# Patient Record
Sex: FEMALE | Race: WHITE | Employment: FULL TIME | URBAN - METROPOLITAN AREA
[De-identification: names, ages, dates, MRNs, and addresses within clinical notes are randomized per-mention and may not be internally consistent; named-entity substitution may affect disease eponyms.]

---

## 2017-05-15 ENCOUNTER — TRANSFERRED RECORDS (OUTPATIENT)
Dept: HEALTH INFORMATION MANAGEMENT | Facility: CLINIC | Age: 22
End: 2017-05-15

## 2017-05-23 ENCOUNTER — HOSPITAL ENCOUNTER (INPATIENT)
Facility: CLINIC | Age: 22
LOS: 3 days | Discharge: HOME | DRG: 885 | End: 2017-05-26
Attending: PSYCHIATRY & NEUROLOGY | Admitting: PSYCHIATRY & NEUROLOGY
Payer: COMMERCIAL

## 2017-05-23 DIAGNOSIS — F41.9 ANXIETY: Primary | ICD-10-CM

## 2017-05-23 DIAGNOSIS — F31.30 BIPOLAR I DISORDER, MOST RECENT EPISODE DEPRESSED (H): ICD-10-CM

## 2017-05-23 PROBLEM — F48.9 MENTAL HEALTH PROBLEM: Status: ACTIVE | Noted: 2017-05-23

## 2017-05-23 PROCEDURE — 25000132 ZZH RX MED GY IP 250 OP 250 PS 637: Performed by: PSYCHIATRY & NEUROLOGY

## 2017-05-23 PROCEDURE — 12400001 ZZH R&B MH UMMC

## 2017-05-23 RX ORDER — HYDROXYZINE HYDROCHLORIDE 25 MG/1
25-50 TABLET, FILM COATED ORAL EVERY 4 HOURS PRN
Status: DISCONTINUED | OUTPATIENT
Start: 2017-05-23 | End: 2017-05-26 | Stop reason: HOSPADM

## 2017-05-23 RX ORDER — BISACODYL 10 MG
10 SUPPOSITORY, RECTAL RECTAL DAILY PRN
Status: DISCONTINUED | OUTPATIENT
Start: 2017-05-23 | End: 2017-05-26 | Stop reason: HOSPADM

## 2017-05-23 RX ORDER — TRAZODONE HYDROCHLORIDE 50 MG/1
50 TABLET, FILM COATED ORAL
Status: DISCONTINUED | OUTPATIENT
Start: 2017-05-23 | End: 2017-05-26 | Stop reason: HOSPADM

## 2017-05-23 RX ORDER — CLONAZEPAM 0.5 MG/1
1 TABLET ORAL 3 TIMES DAILY PRN
Status: DISCONTINUED | OUTPATIENT
Start: 2017-05-23 | End: 2017-05-23

## 2017-05-23 RX ORDER — ACETAMINOPHEN 325 MG/1
650 TABLET ORAL EVERY 4 HOURS PRN
Status: DISCONTINUED | OUTPATIENT
Start: 2017-05-23 | End: 2017-05-26 | Stop reason: HOSPADM

## 2017-05-23 RX ORDER — ALUMINA, MAGNESIA, AND SIMETHICONE 2400; 2400; 240 MG/30ML; MG/30ML; MG/30ML
30 SUSPENSION ORAL EVERY 4 HOURS PRN
Status: DISCONTINUED | OUTPATIENT
Start: 2017-05-23 | End: 2017-05-26 | Stop reason: HOSPADM

## 2017-05-23 RX ORDER — NICOTINE 21 MG/24HR
1 PATCH, TRANSDERMAL 24 HOURS TRANSDERMAL DAILY
Status: DISCONTINUED | OUTPATIENT
Start: 2017-05-24 | End: 2017-05-26 | Stop reason: HOSPADM

## 2017-05-23 RX ORDER — OLANZAPINE 10 MG/1
10 TABLET ORAL
Status: DISCONTINUED | OUTPATIENT
Start: 2017-05-23 | End: 2017-05-26 | Stop reason: HOSPADM

## 2017-05-23 RX ORDER — RISPERIDONE 0.5 MG/1
0.5 TABLET ORAL 2 TIMES DAILY
Status: DISCONTINUED | OUTPATIENT
Start: 2017-05-23 | End: 2017-05-26 | Stop reason: HOSPADM

## 2017-05-23 RX ORDER — CLONAZEPAM 0.5 MG/1
1.5 TABLET ORAL DAILY PRN
Status: DISCONTINUED | OUTPATIENT
Start: 2017-05-24 | End: 2017-05-26 | Stop reason: HOSPADM

## 2017-05-23 RX ORDER — OLANZAPINE 10 MG/2ML
10 INJECTION, POWDER, FOR SOLUTION INTRAMUSCULAR
Status: DISCONTINUED | OUTPATIENT
Start: 2017-05-23 | End: 2017-05-26 | Stop reason: HOSPADM

## 2017-05-23 RX ADMIN — NICOTINE POLACRILEX 8 MG: 4 GUM, CHEWING ORAL at 23:08

## 2017-05-23 RX ADMIN — RISPERIDONE 0.5 MG: 0.5 TABLET ORAL at 23:08

## 2017-05-23 RX ADMIN — OLANZAPINE 10 MG: 10 TABLET, FILM COATED ORAL at 23:08

## 2017-05-23 ASSESSMENT — ACTIVITIES OF DAILY LIVING (ADL)
DRESS: INDEPENDENT
GROOMING: INDEPENDENT
ORAL_HYGIENE: INDEPENDENT

## 2017-05-23 NOTE — IP AVS SNAPSHOT
[place] [facility]    [facility] [address]    [address] [address]    Phone:  [phone]                                       After Visit Summary   5/23/2017    Raissa Ruth    MRN: [id number]           After Visit Summary Signature Page     I have received my discharge instructions, and my questions have been answered. I have discussed any challenges I see with this plan with the nurse or doctor.    ..........................................................................................................................................  Patient/Patient Representative Signature      ..........................................................................................................................................  Patient Representative Print Name and Relationship to Patient    ..................................................               ................................................  Date                                            Time    ..........................................................................................................................................  Reviewed by Signature/Title    ...................................................              ..............................................  Date                                                            Time

## 2017-05-23 NOTE — IP AVS SNAPSHOT
MRN:[id number]                      After Visit Summary   5/23/2017    Raissa Ruth    MRN: [id number]           Patient Information     Date Of Birth          1995        Designated Caregiver       Most Recent Value    Caregiver    Will someone help with your care after discharge? no      About your hospital stay     You were admitted on:  May 23, 2017 You last received care in the:  [facility]    You were discharged on:  May 26, 2017       Who to Call     For medical emergencies, please call 911.  For non-urgent questions about your medical care, please call your primary care provider or clinic, None          Attending Provider     Provider Specialty    Shamar Henderson MD Psychiatry       Primary Care Provider    [facility]       No address on file        Further instructions from your care team       Behavioral Discharge Planning and Instructions       Summary: You were admitted on 5/23/2017 to Station [address] [place] for increase in depression and suicidal ideation.    You were treated by Debra Naegele, APRN, CNS and discharged on 5/26/17. .     Disposition: Discharged to home    Principal Diagnosis:  Bipolar Disorder; Cannabis use disorder, moderate; Amphetamine use disorder, moderate; Benzodiazepine use disorder, moderate.      Psychiatry Follow-up Appointment:  5/31/17 @ 2:30pm:  Psychiatrist/Dayan Akers @ [facility]:  [address] [place], [place].  # [phone].    Therapist:  SAIMA Rodriguez therapist @ [facility] [place] [facility]  Individual Therapist:  Patience Piña @ [facility], [place]:    Appointments are scheduled     Chemical Dependency Referrals have been made to the following treatment facilities:  [place]:  [facility]:  [place], [place].      Attend all scheduled appointments with your outpatient providers. Call at least 24 hours in advance if you need to reschedule an  appointment to ensure continued access to your outpatient providers.   Major Treatments, Procedures and Findings: You were provided with: a psychiatric assessment, assessed for medical stability, medication evaluation and/or management, individual therapy, art therapy, CD evaluation/assessment and milieu management.    Symptoms to Report: If you experience any of the following symptoms please report them right away to your provider or to family/friends; feeling more aggressive, increased confusion, losing more sleep, mood getting worse or thoughts of suicide.    Early warning signs can include: Early warning signs that could signal a potential relapse could include but not limited to the following; increased depression or anxiety sleep disturbances increased thoughts or behaviors of suicide or self-harm  increased unusual thinking, such as paranoia or hearing voices.    Safety and Wellness: Ask your support system to help you reduce your access to items that could harm yourself or others. If there is a concern for safety, call 911..    Resources:    Crisis Intervention: 363.436.2495 or 855-274-7175 (TTY: 571.170.3125).  Call anytime for help.  [place] on Mental Illness (www.[place].ольг[place].org): 460.215.4259 or 862-890-5918.  Suicide Awareness Voices of Education (SAVE) (www.save.org): 698-858-ZQRZ (0680)  National Suicide Prevention Line (www.mentalhealthmn.org): 871-320-HELS (6277)  Mental Health Consumer/Survivor Network of [place] (www.mhcsn.net): 971.128.8492 or 727-700-0378  Mental Health Association of [place] (www.mentalhealth.org): 560.142.9465 or 308-985-5335    The treatment team has appreciated the opportunity to work with you. Raissa,  please take care and make your recovery a daily recovery. If you have any questions or concerns our unit number is [phone].       Pending Results     No orders found from 5/21/2017 to 5/24/2017.            Admission Information     Date & Time Provider Department Dept. Phone  "   2017 Shamar Henderson MD Young Adult [facility] [phone]      Your Vitals Were     Blood Pressure Pulse Temperature Respirations Height Weight    102/66 96 97.2  F (36.2  C) (Oral) 16 1.702 m (5' 7\") 56.8 kg (125 lb 3.2 oz)    Pulse Oximetry BMI (Body Mass Index)                96% 19.61 kg/m2          [employer] Information     [employer] lets you send messages to your doctor, view your test results, renew your prescriptions, schedule appointments and more. To sign up, go to [email or web address] . Click on \"Log in\" on the left side of the screen, which will take you to the Welcome page. Then click on \"Sign up Now\" on the right side of the page.     You will be asked to enter the access code listed below, as well as some personal information. Please follow the directions to create your username and password.     Your access code is: [email or web address]  Expires: 2017  1:15 PM     Your access code will  in 90 days. If you need help or a new code, please call your [place] clinic or [phone].        Care EveryWhere ID     This is your Care EveryWhere ID. This could be used by other organizations to access your [place] medical records  [id number]           Review of your medicines      START taking        Dose / Directions    hydrOXYzine 25 MG tablet   Commonly known as:  ATARAX   Used for:  Anxiety        Dose:  25-50 mg   Take 1-2 tablets (25-50 mg) by mouth every 4 hours as needed for anxiety   Quantity:  120 tablet   Refills:  1       lithium 300 MG CR tablet   Commonly known as:  ESKALITH/LITHOBID   Used for:  Bipolar I disorder, most recent episode depressed (H)        Dose:  600 mg   Take 2 tablets (600 mg) by mouth At Bedtime   Quantity:  60 tablet   Refills:  1       risperiDONE 0.5 MG tablet   Commonly known as:  risperDAL   Used for:  Bipolar I disorder, most recent episode depressed (H)        Dose:  0.5 mg   Take 1 tablet (0.5 mg) by mouth 2 times daily   Quantity:  60 " tablet   Refills:  1         CONTINUE these medicines which have NOT CHANGED        Dose / Directions    LANsoprazole 15 MG CR capsule   Commonly known as:  PREVACID        Refills:  2       medroxyPROGESTERone 150 MG/ML injection   Commonly known as:  DEPO-PROVERA   Used for:  Other and unspecified ovarian cyst, Unspecified symptom associated with female genital organs        Dose:  150 mg   Inject 1 mL (150 mg) into the muscle every 3 months   Quantity:  3 mL   Refills:  3         STOP taking     buPROPion 150 MG 24 hr tablet   Commonly known as:  WELLBUTRIN XL           clonazePAM 1 MG tablet   Commonly known as:  klonoPIN           DULoxetine 60 MG EC capsule   Commonly known as:  CYMBALTA           OXcarbazepine 150 MG tablet   Commonly known as:  TRILEPTAL                Where to get your medicines      These medications were sent to [facility] [place], [place] - [address]  [address] [facility] [address], [facility] [place] [address]     Phone:  [phone]     hydrOXYzine 25 MG tablet    lithium 300 MG CR tablet    risperiDONE 0.5 MG tablet                Protect others around you: Learn how to safely use, store and throw away your medicines at www.disposemymeds.org.             Medication List: This is a list of all your medications and when to take them. Check marks below indicate your daily home schedule. Keep this list as a reference.      Medications           Morning Afternoon Evening Bedtime As Needed    hydrOXYzine 25 MG tablet   Commonly known as:  ATARAX   Take 1-2 tablets (25-50 mg) by mouth every 4 hours as needed for anxiety   Last time this was given:  50 mg on 5/26/2017  1:53 PM                                   LANsoprazole 15 MG CR capsule   Commonly known as:  PREVACID                                lithium 300 MG CR tablet   Commonly known as:  ESKALITH/LITHOBID   Take 2 tablets (600 mg) by mouth At Bedtime   Last time this was given:  600 mg on 5/25/2017  8:06 PM                                    medroxyPROGESTERone 150 MG/ML injection   Commonly known as:  DEPO-PROVERA   Inject 1 mL (150 mg) into the muscle every 3 months                                risperiDONE 0.5 MG tablet   Commonly known as:  risperDAL   Take 1 tablet (0.5 mg) by mouth 2 times daily   Last time this was given:  0.5 mg on 5/26/2017  8:45 AM

## 2017-05-24 PROCEDURE — 99222 1ST HOSP IP/OBS MODERATE 55: CPT | Mod: AI | Performed by: CLINICAL NURSE SPECIALIST

## 2017-05-24 PROCEDURE — 25000132 ZZH RX MED GY IP 250 OP 250 PS 637: Performed by: CLINICAL NURSE SPECIALIST

## 2017-05-24 PROCEDURE — 12400001 ZZH R&B MH UMMC

## 2017-05-24 PROCEDURE — 99221 1ST HOSP IP/OBS SF/LOW 40: CPT | Performed by: PHYSICIAN ASSISTANT

## 2017-05-24 PROCEDURE — 25000132 ZZH RX MED GY IP 250 OP 250 PS 637: Performed by: PSYCHIATRY & NEUROLOGY

## 2017-05-24 RX ORDER — LITHIUM CARBONATE 300 MG/1
600 TABLET, FILM COATED, EXTENDED RELEASE ORAL AT BEDTIME
Status: DISCONTINUED | OUTPATIENT
Start: 2017-05-24 | End: 2017-05-26 | Stop reason: HOSPADM

## 2017-05-24 RX ADMIN — CLONAZEPAM 1.5 MG: 0.5 TABLET ORAL at 16:29

## 2017-05-24 RX ADMIN — RISPERIDONE 0.5 MG: 0.5 TABLET ORAL at 13:51

## 2017-05-24 RX ADMIN — OLANZAPINE 10 MG: 10 TABLET, FILM COATED ORAL at 22:41

## 2017-05-24 RX ADMIN — LITHIUM CARBONATE 600 MG: 300 TABLET, EXTENDED RELEASE ORAL at 21:52

## 2017-05-24 RX ADMIN — NICOTINE POLACRILEX 8 MG: 4 GUM, CHEWING ORAL at 21:55

## 2017-05-24 RX ADMIN — NICOTINE POLACRILEX 8 MG: 4 GUM, CHEWING ORAL at 16:29

## 2017-05-24 RX ADMIN — RISPERIDONE 0.5 MG: 0.5 TABLET ORAL at 21:51

## 2017-05-24 RX ADMIN — NICOTINE POLACRILEX 8 MG: 4 GUM, CHEWING ORAL at 18:44

## 2017-05-24 RX ADMIN — NICOTINE 1 PATCH: 14 PATCH, EXTENDED RELEASE TRANSDERMAL at 13:52

## 2017-05-24 RX ADMIN — NICOTINE POLACRILEX 8 MG: 4 GUM, CHEWING ORAL at 14:06

## 2017-05-24 ASSESSMENT — ACTIVITIES OF DAILY LIVING (ADL)
AMBULATION: 0-->INDEPENDENT
PRIOR_FUNCTIONAL_LEVEL_COMMENT: POOR
DRESS: INDEPENDENT
SWALLOWING: 0-->SWALLOWS FOODS/LIQUIDS WITHOUT DIFFICULTY
FALL_HISTORY_WITHIN_LAST_SIX_MONTHS: NO
TRANSFERRING: 0-->INDEPENDENT
RETIRED_COMMUNICATION: 0-->UNDERSTANDS/COMMUNICATES WITHOUT DIFFICULTY
DRESS: 0-->INDEPENDENT
BATHING: 0-->INDEPENDENT
ORAL_HYGIENE: INDEPENDENT
RETIRED_EATING: 0-->INDEPENDENT
COGNITION: 1 - ATTENTION OR MEMORY DEFICITS
GROOMING: INDEPENDENT
TOILETING: 0-->INDEPENDENT
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
## 2017-05-24 NOTE — PROGRESS NOTES
Writer JUSTUS for patient's mom (Ailyn [phone]) requesting call back on whether patient can return home.     Mom called back and reported that patient admitted with the intention of going to residential treatment and that is what mom thinks she needs. Mom reports that if patient will return home, the same patterns will persist and mom is tired of it.     Patient requested to talk to [facility] about discharge planning with her father who was visiting. Dad reported that patient is welcome to come home, but they feel a need for a plan in place to help patient stabilize. Different options were discussed. Patient is open to residential, but feels afraid. Patient decided she will pursue the option of [facility] residential with an interest in [facility]. Writer called Rodger Alvarado and JUSTUS requesting CD assessment.

## 2017-05-24 NOTE — PLAN OF CARE
"Problem: Depressive Symptoms  Goal: Depressive Symptoms  Signs and symptoms of listed problems will be absent or manageable.  Patient, prior to discharge, will:  -verbalize decrease in depressive signs/symptoms  -verbalize a decrease in anxiety   -verbalize an understanding of medication regimen   -verbalize absence of SI/SIB   -develop a safety plan  -identify a support system   -will participate in coordination of discharge planning   Pt admitted to unit on a 72 hour hold at approximately 2200 for suicidal ideation without specific plan. Upon arrival to unit, pt originally tried to refuse clothing search and sat on the floor stating \"what if I refuse?\". Pt informed that refusal of search isn't an option. Pt finally agreed to search. Pt then told psych associate \"I'm gonna flip fucking shit if I don't get my anxiety meds. I need my Klonopin NOW.\" Writer overheard pt speaking to staff in this way and intervened. Writer invited to pt to come sit down and talk about what brought her in here and ways to manage her anxiety, including different medications. Writer informed pt of different medications that can help ease her anxiety but that she would not be getting anymore clonazepam this evening. Pt inquired as to why. Writer informed pt that it's only prescribed (PTA medication) as 1.5 mg daily as needed for anxiety, and [facility] ED staff gave it to pt at 1600. Pt then states \"yeah, because my outside doctor is trying to take me off of it. I fuckin' hate that bitch\". Pt then informed writer that she has been taking closer to 4 mg of clonazepam daily. When calling for admission orders, Dr. Oakley informed of this. MSSA protocol initiated due to potential risk for benzo withdrawal. Pt scored a 6 upon arrival to unit, largely due to agitation. Writer also spoke with pt regarding what brought her into hospital and empathized with patient. Pt endorsed suicidal ideation (thoughts only) due to feeling like \"nobody gives a " "shit about me\". Writer then inquired about her parents who brought her to hospital due to their concern, and pt states \"yeah, I guess they care and don't want me to die\". Pt does contract for safety on the unit and verbalizes understanding what this means. Pt also provided a journal to writer in and head phones to listen to to help manage anxiety. After receiving admission orders, pt received her prescribed PTA Risperdal 0.5 mg, Zyprexa 10 mg and nicorette gum. Pt's u-tox positive for amphetamines, cocaine, benzos and THC. Hcg negative. Most of admission profile still needs to be completed.       "

## 2017-05-24 NOTE — PROGRESS NOTES
05/23/17 2233   Patient Belongings   Did you bring any home meds/supplements to the hospital?  Yes   Disposition of meds  Sent to security/pharmacy per site process   Patient Belongings necklace;plastic bag;shoes;cell phone/electronics   Disposition of Belongings belongings sent to pt bin   Belongings Search Yes   Clothing Search Yes   Second Staff Corinne A.     Pt belongings sent to pt bin: underwear, black socks, tan pants, sports bra, sweatshirt, white IPhone, phone , necklace (silver color)    ADMISSION:  I am responsible for any personal items that are not sent to the safe or pharmacy. [facility] is not responsible for loss, theft or damage of any property in my possession.    Patient Signature _____________________ Date/Time _____________________    Staff Signature _______________________ Date/Time _____________________    2nd Staff person, if patient is unable/unwilling to sign  ___________________________________ Date/Time _____________________    DISCHARGE:  My personal items have been returned to me.   Patient Signature _____________________ Date/Time _____________________
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
## 2017-05-24 NOTE — PLAN OF CARE
Problem: General Plan of Care (Inpatient Behavioral)  Goal: Team Discussion  Team Plan:   BEHAVIORAL TEAM DISCUSSION     Behavioral Team Discussion: (5/24/2017)     Continued Stay Criteria/Rationale: Patient admitted for Suicidal Ideations.  Plan: The following services will be provided to the patient; psychiatric assessment, medication management, therapeutic milieu, individual and group support, art therapy, and skills/OT groups.   Participants: 4A Provider: Debra Naegele, APRN, CNS; 4A RN's: Abdullahi Griffin, RN and Elvia RN; 4A CTC's: Roland Rinaldi (CTC) and Odalys Sanchez (CTC).  Summary/Recommendation: Providers will assess today for treatment recommendations, discharge planning, and aftercare plans. CTC will meet with pt to complete psych-social assessment.   Medical/Physical: Deferred (see medical notes).  Progress: Inital evaluaton.

## 2017-05-24 NOTE — PROVIDER NOTIFICATION
05/24/17 0416   Substance Withdrawal   Substance Withdrawal Modified Selective Severity Assessment (MSSA)   Modified Selective Severity Assessment (MSSA)   Eating Disturbances 0   Tremor 0   Sleep Disturbance 0   Clouding of Sensorium 0   Hallucinations 0   Quality of Contact 1   Agitation 1   Paroxysmal Sweats 0   Temperature 1   Pulse 0   Total MSSA Score 3   Pt slightly difficult to wake for MSSA, reporting being very fatigued. Pt initially refused vitals but then complied. BP noted to be low (88/44) with HR 62. Pt may require smaller/child sized cuff for more accurate reading. Pt denied s/s of withdrawal. Will continue to monitor.     MSSA rechecked at 0640: Pt still very fatigued. /70 HR 70. Pt denies s/s of withdrawal, however slight sweating noted.    MSSAs on nights: 3, 4 respectively.

## 2017-05-24 NOTE — PLAN OF CARE
"Problem: General Plan of Care (Inpatient Behavioral)  Goal: Individualization/Patient Specific Goal (IP Behavioral)  The patient and/or their representative will achieve their patient-specific goals related to the plan of care.    The patient-specific goals include:  Illness Management Recovery model: Objectives    Patient will identify reason(s) for hospitalization from their perspective.  Patient will identify a minimum of three goals for discharge.  Patient will identify a minimum of three triggers that may increase their symptoms.  Patient will identify a minimum of three coping skills they can do to stay well.   Patient will identify their support system to demonstrate readiness for discharge.    The patient and/or their representative will achieve their patient-specific goals related to the plan of care.    The patient-specific goals include:   The patient completed the reasons for admit and goals for discharge in the personal plan of care.   Reasons for admit:  1)  \"There is no point anything anymore.\"  2)  \"Noone likes me.\"  3)  Depressed  4)  \"I want to quit anything.\"     Goals for discharge:  1)  \"I want to leave/go home.\"  2)  Return to outpatient providers  3)  Refer to day treatment          "

## 2017-05-24 NOTE — PROGRESS NOTES
05/24/17 1400   Behavioral Health   Hallucinations denies / not responding to hallucinations   Thinking intact   Orientation person: oriented;place: oriented;date: oriented;time: oriented   Memory baseline memory   Insight admits / accepts   Judgement impaired   Eye Contact at examiner   Affect tense;full range affect   Mood mood is calm   Physical Appearance/Attire attire appropriate to age and situation;neat   Hygiene well groomed   Suicidality other (see comments)  (denies)   Self Injury other (see comment)  (denies)   Activity isolative   Speech clear;coherent   Psychomotor / Gait balanced;steady   [place] Risk Assessment   Sensory Perception 4-->no impairment   Moisture 3-->occasionally moist   Activity 4-->walks frequently   Mobility 4-->no limitation   Nutrition 3-->adequate   Friction and Shear 3-->no apparent problem   [place] Score 21   Activities of Daily Living   Hygiene/Grooming independent   Oral Hygiene independent   Dress independent   Room Organization independent   Behavioral Health Interventions   Depression assist patient in developing safety plan;assist patient in following safety plan;encourage nutrition and hydration;encourage participation / independence with adls;provide emotional support;establish therapeutic relationship;assist with developing and utilizing healthy coping strategies;build upon strengths;assess patient response to medication   Social and Therapeutic Interventions (Depression) encourage socialization with peers;encourage effective boundaries with peers;encourage participation in therapeutic groups and milieu activities     Pt slept most of day shift. Pt ate 80% of all meals. Pt was irritable later was calm and pleasant. Pt was not awake for groups. Pt denied SI and SIB. Pt denied hallucinations/psychotic symptoms.

## 2017-05-25 LAB
ALBUMIN SERPL-MCNC: 3.8 G/DL (ref 3.4–5)
ALP SERPL-CCNC: 50 U/L (ref 40–150)
ALT SERPL W P-5'-P-CCNC: 13 U/L (ref 0–50)
ANION GAP SERPL CALCULATED.3IONS-SCNC: 5 MMOL/L (ref 3–14)
AST SERPL W P-5'-P-CCNC: 10 U/L (ref 0–45)
BASOPHILS # BLD AUTO: 0 10E9/L (ref 0–0.2)
BASOPHILS NFR BLD AUTO: 0.3 %
BILIRUB SERPL-MCNC: 0.3 MG/DL (ref 0.2–1.3)
BUN SERPL-MCNC: 13 MG/DL (ref 7–30)
CALCIUM SERPL-MCNC: 9.2 MG/DL (ref 8.5–10.1)
CHLORIDE SERPL-SCNC: 110 MMOL/L (ref 94–109)
CHOLEST SERPL-MCNC: 130 MG/DL
CO2 SERPL-SCNC: 28 MMOL/L (ref 20–32)
CREAT SERPL-MCNC: 0.77 MG/DL (ref 0.52–1.04)
DIFFERENTIAL METHOD BLD: ABNORMAL
EOSINOPHIL # BLD AUTO: 0.2 10E9/L (ref 0–0.7)
EOSINOPHIL NFR BLD AUTO: 5.3 %
ERYTHROCYTE [DISTWIDTH] IN BLOOD BY AUTOMATED COUNT: 12.8 % (ref 10–15)
GFR SERPL CREATININE-BSD FRML MDRD: ABNORMAL ML/MIN/1.7M2
GLUCOSE SERPL-MCNC: 91 MG/DL (ref 70–99)
HCT VFR BLD AUTO: 41.1 % (ref 35–47)
HDLC SERPL-MCNC: 49 MG/DL
HGB BLD-MCNC: 13.7 G/DL (ref 11.7–15.7)
IMM GRANULOCYTES # BLD: 0 10E9/L (ref 0–0.4)
IMM GRANULOCYTES NFR BLD: 0 %
LDLC SERPL CALC-MCNC: 71 MG/DL
LYMPHOCYTES # BLD AUTO: 1.7 10E9/L (ref 0.8–5.3)
LYMPHOCYTES NFR BLD AUTO: 44.5 %
MCH RBC QN AUTO: 29.8 PG (ref 26.5–33)
MCHC RBC AUTO-ENTMCNC: 33.3 G/DL (ref 31.5–36.5)
MCV RBC AUTO: 89 FL (ref 78–100)
MONOCYTES # BLD AUTO: 0.4 10E9/L (ref 0–1.3)
MONOCYTES NFR BLD AUTO: 9.7 %
NEUTROPHILS # BLD AUTO: 1.5 10E9/L (ref 1.6–8.3)
NEUTROPHILS NFR BLD AUTO: 40.2 %
NONHDLC SERPL-MCNC: 81 MG/DL
NRBC # BLD AUTO: 0 10*3/UL
NRBC BLD AUTO-RTO: 0 /100
PLATELET # BLD AUTO: 222 10E9/L (ref 150–450)
POTASSIUM SERPL-SCNC: 4 MMOL/L (ref 3.4–5.3)
PROT SERPL-MCNC: 6.9 G/DL (ref 6.8–8.8)
RBC # BLD AUTO: 4.6 10E12/L (ref 3.8–5.2)
SODIUM SERPL-SCNC: 143 MMOL/L (ref 133–144)
TRIGL SERPL-MCNC: 51 MG/DL
TSH SERPL DL<=0.005 MIU/L-ACNC: 0.83 MU/L (ref 0.4–4)
WBC # BLD AUTO: 3.8 10E9/L (ref 4–11)

## 2017-05-25 PROCEDURE — 80053 COMPREHEN METABOLIC PANEL: CPT | Performed by: CLINICAL NURSE SPECIALIST

## 2017-05-25 PROCEDURE — 84443 ASSAY THYROID STIM HORMONE: CPT | Performed by: CLINICAL NURSE SPECIALIST

## 2017-05-25 PROCEDURE — 85025 COMPLETE CBC W/AUTO DIFF WBC: CPT | Performed by: CLINICAL NURSE SPECIALIST

## 2017-05-25 PROCEDURE — H2032 ACTIVITY THERAPY, PER 15 MIN: HCPCS

## 2017-05-25 PROCEDURE — 80061 LIPID PANEL: CPT | Performed by: CLINICAL NURSE SPECIALIST

## 2017-05-25 PROCEDURE — 12400001 ZZH R&B MH UMMC

## 2017-05-25 PROCEDURE — 25000132 ZZH RX MED GY IP 250 OP 250 PS 637: Performed by: CLINICAL NURSE SPECIALIST

## 2017-05-25 PROCEDURE — 25000132 ZZH RX MED GY IP 250 OP 250 PS 637: Performed by: PSYCHIATRY & NEUROLOGY

## 2017-05-25 PROCEDURE — 99232 SBSQ HOSP IP/OBS MODERATE 35: CPT | Performed by: CLINICAL NURSE SPECIALIST

## 2017-05-25 PROCEDURE — 36415 COLL VENOUS BLD VENIPUNCTURE: CPT | Performed by: CLINICAL NURSE SPECIALIST

## 2017-05-25 RX ADMIN — LITHIUM CARBONATE 600 MG: 300 TABLET, EXTENDED RELEASE ORAL at 20:06

## 2017-05-25 RX ADMIN — OLANZAPINE 10 MG: 10 TABLET, FILM COATED ORAL at 19:18

## 2017-05-25 RX ADMIN — TRAZODONE HYDROCHLORIDE 50 MG: 50 TABLET ORAL at 20:06

## 2017-05-25 RX ADMIN — NICOTINE POLACRILEX 8 MG: 4 GUM, CHEWING ORAL at 10:30

## 2017-05-25 RX ADMIN — NICOTINE POLACRILEX 8 MG: 4 GUM, CHEWING ORAL at 11:53

## 2017-05-25 RX ADMIN — NICOTINE POLACRILEX 8 MG: 4 GUM, CHEWING ORAL at 19:18

## 2017-05-25 RX ADMIN — OLANZAPINE 10 MG: 10 TABLET, FILM COATED ORAL at 13:41

## 2017-05-25 RX ADMIN — NICOTINE POLACRILEX 8 MG: 4 GUM, CHEWING ORAL at 08:43

## 2017-05-25 RX ADMIN — RISPERIDONE 0.5 MG: 0.5 TABLET ORAL at 08:31

## 2017-05-25 RX ADMIN — NICOTINE POLACRILEX 8 MG: 4 GUM, CHEWING ORAL at 18:07

## 2017-05-25 RX ADMIN — CLONAZEPAM 1.5 MG: 0.5 TABLET ORAL at 15:02

## 2017-05-25 RX ADMIN — NICOTINE POLACRILEX 8 MG: 4 GUM, CHEWING ORAL at 13:24

## 2017-05-25 RX ADMIN — NICOTINE 1 PATCH: 14 PATCH, EXTENDED RELEASE TRANSDERMAL at 08:31

## 2017-05-25 RX ADMIN — NICOTINE POLACRILEX 8 MG: 4 GUM, CHEWING ORAL at 15:02

## 2017-05-25 RX ADMIN — RISPERIDONE 0.5 MG: 0.5 TABLET ORAL at 20:05

## 2017-05-25 ASSESSMENT — ACTIVITIES OF DAILY LIVING (ADL)
GROOMING: INDEPENDENT
ORAL_HYGIENE: INDEPENDENT
DRESS: INDEPENDENT
GROOMING: INDEPENDENT
ORAL_HYGIENE: INDEPENDENT
DRESS: SCRUBS (BEHAVIORAL HEALTH);INDEPENDENT
LAUNDRY: UNABLE TO COMPLETE

## 2017-05-25 NOTE — PROGRESS NOTES
"   05/25/17 1249   Art Therapy   Type of Intervention structured groups   Response participates with encouragement   Hours 2   Pt was cooperative and pleasant in groups. She did choose to sit at a table alone, she said sitting close to so many people gave her anxiety. She worked on a collage. She also enjoyed playing guitar with one male and one female peer. Writer complimented her on her use of coping skills, and discussed the alternative positive healthy coping skills to drug use. She said \"but they don't always work.\"  "

## 2017-05-25 NOTE — CONSULTS
HISTORY OF PRESENT ILLNESS:  Raissa Ruth is a 21-year-old female with a history of depression admitted to station 4A for suicidal ideation.  Reportedly, the patient lives with her mother and states daily suicidal ideation and threats to kill her parents as well.  An Internal Medicine consultation was ordered by Dr. Henderson's team for a general medical evaluation.  At this time, Raissa admits to cutting herself intentionally approximately 2 weeks ago and has a residual scabbed over laceration on left forearm, however, denies fever, chills, chest pain, shortness of breath, abdominal pain, nausea, bowel or bladder concerns or other skin problems.      PAST MEDICAL HISTORY:   1.  Psychiatric history per Dr. Henderson's team.   2.  Denies history of major medical problems including cardiopulmonary disease, hypertension and diabetes.      PAST SURGICAL HISTORY:   1.  Appendectomy.   2.  [place] teeth extraction.      ADMISSION MEDICATIONS:  Reviewed as listed in medication reconciliation list.      ALLERGIES:  No known drug allergies.      SOCIAL HISTORY:  Single.  No children.  Currently not in school.  Lives in [place].  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  States she smokes marijuana daily.  States last used alcohol 2 days ago.  Smokes on average 10-15 cigarettes daily, states whatever she can get her hands on, other illicit drugs she will do it.      FAMILY HISTORY:  Reviewed and noncontributory.      REVIEW OF SYSTEMS:  Ten-point review of systems negative except as stated above in history of present illness.      PHYSICAL EXAMINATION:   GENERAL:  Healthy-appearing young woman in no acute distress.   VITAL SIGNS:  Stable.  Temperature afebrile, pulse in the 90s, blood pressure 114/70s.   HEENT:  Negative.   NECK:  Supple, no cervical adenopathy or thyromegaly.   LUNGS:  Clear.   CARDIOVASCULAR:  Regular rate and rhythm, no murmurs appreciated.   ABDOMEN:  Soft, nontender.   EXTREMITIES:  No  edema.   SKIN:  Reveals a scabbed over laceration on her left proximal forearm without signs of bleeding or infection.  Rest of exam negative.   NEUROLOGIC:  She is awake, alert and oriented x3.  No acute focal deficits noted.      LABORATORY DATA:  From outside hospital, urine drug screen positive for amphetamines, benzodiazepines, and THC.  Otherwise CBC with differential and basic metabolic panel normal.  Alcohol level negative.  Urine pregnancy test negative.      IMPRESSION:   1.  Depression and behavioral concerns per Dr. Henderson's team.   2.  Acute self-inflicted left forearm laceration, superficial and appears to be healing well without signs of infection.   3.  Otherwise, in overall normally good physical health.      PLAN:  No medical intervention indicated at this time.  The patient is medically stable and I will follow her up during her stay for any intercurrent medical issues.         EUGENIO LARA PA-C             D: 2017 15:54   T: 2017 22:08   MT: LQ      Name:     EMMA COOLEY   MRN:      [id number]        Account:       [id number]   :      1995           Consult Date:  2017      Document: [id number]

## 2017-05-25 NOTE — PROGRESS NOTES
"Patient did have a  \"goodshift\" , stated daily goal to \" Not to freak out about being here\"and plans towards discharge include\"  Talked to doctor  And waiting for assessment for out patient placement\"  Pt .attended, participate in groups,socialized  and  was visible in the milieu.   Pt appears normal for age, calm, irritable and fussy  Pt indicated feeling depressed 7/10 anxious 10/10, irritable, agitated and paranoid.Patient had a poor concentration  Patient is cooperative and pleasant, affect is subdued,flat and is hopeful.   Patient denies  pain. Patient is eating 100%.  Patient reports suicidal ideation with out intention or a suicidal plan  Pt denies SIB, HI: denies current or recent homicidal ideation or behaviors.  Hallucinations: NO auditory and visual hallujcination  Patient is progressing toward goals. Concerns (explain) - Getting out of here  Patient evaluation continues as patient is encouraged to participate in groups an develop healthy coping skills.   05/25/17 1343   Behavioral Health   Hallucinations denies / not responding to hallucinations   Thinking confused;distractable;paranoid;poor concentration   Orientation person: oriented;place: oriented;date: oriented;time: oriented   Memory baseline memory   Insight admits / accepts;poor   Judgement impaired   Eye Contact at examiner   Affect blunted, flat;sad;tense;full range affect;irritable   Mood depressed;anxious;mood is calm   Physical Appearance/Attire disheveled;attire appropriate to age and situation;neat   Hygiene well groomed   Suicidality (Pt indicated SI, thoughts only with no plan or intent to act)   Self Injury (Pt denies SIB)   Activity (Pt participated in grps, socialized and was in the milieu)   Speech clear;coherent   Medication Sensitivity no stated side effects;no observed side effects   Psychomotor / Gait balanced;steady   Psycho Education   Type of Intervention 1:1 intervention   Response participates, initiates socially appropriate "   Hours 0.5   Activities of Daily Living   Hygiene/Grooming independent   Oral Hygiene independent   Dress independent   Laundry unable to complete   Room Organization independent   Activity   Activity Level of Assistance independent   Behavioral Health Interventions   Depression encourage nutrition and hydration;encourage participation / independence with adls;provide emotional support;establish therapeutic relationship;assist with developing and utilizing healthy coping strategies;build upon strengths   Social and Therapeutic Interventions (Depression) encourage socialization with peers;encourage effective boundaries with peers;encourage participation in therapeutic groups and milieu activities

## 2017-05-25 NOTE — PROGRESS NOTES
"[facility], [place]   Psychiatric Progress Note        Interim History:   The patient's care was discussed with the treatment team during the daily team meeting and/or staff's chart notes were reviewed.  Staff report patient slept most of day shift. She cam and cooperative during the afternoon.     Patient denies any side effects from Lithium. She continues to be anxious. She is indecisive about CD treatment. She is willing to meet the CD  tomorrow. She stated, \"I still want to use drugs. But I know if is bad for my mental health.\"          Medications:       lithium  600 mg Oral At Bedtime     nicotine   Transdermal Q8H     nicotine   Transdermal Daily     nicotine  1 patch Transdermal Daily     risperiDONE  0.5 mg Oral BID          Allergies:   No Known Allergies       Labs:   No results found for this or any previous visit (from the past 24 hour(s)).       Psychiatric Examination:   /56  Pulse 66  Temp 97.9  F (36.6  C) (Tympanic)  Resp 16  Ht 1.702 m (5' 7\")  Wt 56.3 kg (124 lb 3.2 oz)  SpO2 96%  BMI 19.45 kg/m2  Weight is 124 lbs 3.2 oz  Body mass index is 19.45 kg/(m^2).    Appearance: awake, alert, adequately groomed and dressed in hospital scrubs  Attitude:  cooperative  Eye Contact:  good  Mood:  anxious  Affect:  labile  Speech:  clear, coherent  Psychomotor Behavior:  no evidence of tardive dyskinesia, dystonia, or tics  Throught Process:  linear  Associations:  no loose associations  Thought Content:  no evidence of suicidal ideation or homicidal ideation  Insight:  limited  Judgement:  limited  Oriented to:  time, person, and place  Attention Span and Concentration:  fair  Recent and Remote Memory:  fair         Precautions:     Behavioral Orders   Procedures     Code 1 - Restrict to Unit     Routine Programming     As clinically indicated     Self Injury Precaution     Status 15     Every 15 minutes.     Suicide precautions          DIagnoses:   1.  " Bipolar disorder.   2.  Borderline personality disorder.   3.  Cannabis use disorder, moderate.   4.  Amphetamine use disorder, moderate.   5.  Benzodiazepine use disorder, moderate.              Plan:   Legal: 72 hour hold expires on 5/26    Medication management: She started Lithium 600 mg for moo stabilization. Will continue Risperdal 0.5 mg BID. Patient is using Zyprexa PRN for agitation.     Disposition: Patient is presenting with a stable mood. She denies suicidal thinking. She has a CD assessment scheduled for Friday 5/26.

## 2017-05-25 NOTE — PLAN OF CARE
"Problem: Depressive Symptoms  Goal: Depressive Symptoms  Signs and symptoms of listed problems will be absent or manageable.  Patient, prior to discharge, will:  -verbalize decrease in depressive signs/symptoms  -verbalize a decrease in anxiety   -verbalize an understanding of medication regimen   -verbalize absence of SI/SIB   -develop a safety plan  -identify a support system   -will participate in coordination of discharge planning   Outcome: No Change    05/24/17 9723   Depressive Symptoms   Depressive Symptoms Assessed all   Depressive Symptoms Present mood;anxiety;insight;thought process   Pt had a calm shift.  Pt was bright but withdrawn.  Pt was visible in the milieu.  Attended but did not participate in groups.  Pt did not have a daily goal today.  Pt reports she is not suicidal but feels bad because \"I am all alone\".  Pt denies SI/SIB.  Reports appetite is good, sleep is \"so-so\".  Denies pain and all medication side effects.  Pt reports she will like to be discharged tomorrow to either residential or day treatment.  Pt is independent with ADL's but did not take a shower tonight.   Pt scored 5 and 4 respectively on the MSSA scale.  Will continue to monitor.      "

## 2017-05-26 VITALS
BODY MASS INDEX: 19.65 KG/M2 | HEART RATE: 96 BPM | DIASTOLIC BLOOD PRESSURE: 66 MMHG | SYSTOLIC BLOOD PRESSURE: 102 MMHG | OXYGEN SATURATION: 96 % | HEIGHT: 67 IN | WEIGHT: 125.2 LBS | TEMPERATURE: 97.2 F | RESPIRATION RATE: 16 BRPM

## 2017-05-26 PROCEDURE — 99239 HOSP IP/OBS DSCHRG MGMT >30: CPT | Performed by: CLINICAL NURSE SPECIALIST

## 2017-05-26 PROCEDURE — 25000132 ZZH RX MED GY IP 250 OP 250 PS 637: Performed by: PSYCHIATRY & NEUROLOGY

## 2017-05-26 RX ORDER — LITHIUM CARBONATE 300 MG/1
600 TABLET, FILM COATED, EXTENDED RELEASE ORAL AT BEDTIME
Qty: 60 TABLET | Refills: 1 | Status: ON HOLD | OUTPATIENT
Start: 2017-05-26 | End: 2018-02-08

## 2017-05-26 RX ORDER — HYDROXYZINE HYDROCHLORIDE 25 MG/1
25-50 TABLET, FILM COATED ORAL EVERY 4 HOURS PRN
Qty: 120 TABLET | Refills: 1 | Status: ON HOLD | OUTPATIENT
Start: 2017-05-26 | End: 2018-02-08

## 2017-05-26 RX ORDER — RISPERIDONE 0.5 MG/1
0.5 TABLET ORAL 2 TIMES DAILY
Qty: 60 TABLET | Refills: 1 | Status: ON HOLD | OUTPATIENT
Start: 2017-05-26 | End: 2018-02-08

## 2017-05-26 RX ADMIN — HYDROXYZINE HYDROCHLORIDE 50 MG: 25 TABLET ORAL at 13:53

## 2017-05-26 RX ADMIN — RISPERIDONE 0.5 MG: 0.5 TABLET ORAL at 08:45

## 2017-05-26 RX ADMIN — NICOTINE POLACRILEX 8 MG: 4 GUM, CHEWING ORAL at 13:19

## 2017-05-26 RX ADMIN — NICOTINE POLACRILEX 8 MG: 4 GUM, CHEWING ORAL at 10:32

## 2017-05-26 RX ADMIN — NICOTINE POLACRILEX 8 MG: 4 GUM, CHEWING ORAL at 08:44

## 2017-05-26 RX ADMIN — HYDROXYZINE HYDROCHLORIDE 50 MG: 25 TABLET ORAL at 08:45

## 2017-05-26 RX ADMIN — NICOTINE 1 PATCH: 14 PATCH, EXTENDED RELEASE TRANSDERMAL at 08:44

## 2017-05-26 RX ADMIN — NICOTINE POLACRILEX 8 MG: 4 GUM, CHEWING ORAL at 11:25

## 2017-05-26 RX ADMIN — CLONAZEPAM 1.5 MG: 0.5 TABLET ORAL at 11:24

## 2017-05-26 ASSESSMENT — ACTIVITIES OF DAILY LIVING (ADL)
LAUNDRY: UNABLE TO COMPLETE
ORAL_HYGIENE: INDEPENDENT
GROOMING: INDEPENDENT
DRESS: SCRUBS (BEHAVIORAL HEALTH)

## 2017-05-26 NOTE — PROGRESS NOTES
Pt had an okay first part of shift and a more difficult second half. Pt was present on milieu and cooperative with staff. Pt was feeling very anxious but was able to seek out staff. Playing guitar/music is a coping mechanism. Pt's father visited in evening and brought a stuffed monkey, pt was extremely angry when told she was unable to have monkey on unit. Pt took PRN and was isolative and withdrawn rest of night. Pt was able to calm down and went to bed early.    Appetite: good  Sleep: good  SI/SIB: denies  Anxiety: high       05/25/17 2200   Behavioral Health   Hallucinations denies / not responding to hallucinations   Thinking poor concentration   Orientation person: oriented;place: oriented;date: oriented;time: oriented   Memory baseline memory   Insight poor;insight appropriate to situation   Judgement impaired   Eye Contact at examiner   Affect angry;blunted, flat;sad;tense;irritable   Mood depressed;hopeless;irritable   Physical Appearance/Attire attire appropriate to age and situation   Hygiene well groomed   Suicidality thoughts only   Self Injury other (see comment)  (denies)   Activity isolative;other (see comment)  (present on milieu)   Speech clear;coherent   Medication Sensitivity no stated side effects;no observed side effects   Psychomotor / Gait balanced;steady   Activities of Daily Living   Hygiene/Grooming independent   Oral Hygiene independent   Dress scrubs (behavioral health);independent   Room Organization independent   Activity   Activity Level of Assistance independent   Behavioral Health Interventions   Depression maintain safety precautions;maintain safe secure environment;provide emotional support;establish therapeutic relationship;assist with developing and utilizing healthy coping strategies   Social and Therapeutic Interventions (Depression) encourage socialization with peers;encourage participation in therapeutic groups and milieu activities

## 2017-05-26 NOTE — DISCHARGE INSTRUCTIONS
Behavioral Discharge Planning and Instructions       Summary: You were admitted on 5/23/2017 to Station [address] [place] [address] for increase in depression and suicidal ideation.    You were treated by Debra Naegele, APRN, CNS and discharged on 5/26/17. .     Disposition: Discharged to home    Principal Diagnosis:  Bipolar Disorder; Cannabis use disorder, moderate; Amphetamine use disorder, moderate; Benzodiazepine use disorder, moderate.      Psychiatry Follow-up Appointment:  5/31/17 @ 2:30pm:  Psychiatrist/Dayan Akers @ [facility]:  [address], [place].  # [phone].    Therapist:  SAIMA Rodriguez [place] [facility] [place]  Individual Therapist:  Patience Piña @ [facility], [place]:    Appointments are scheduled     Chemical Dependency Referrals have been made to the following treatment facilities:  [place]:  [facility]:  [place], [place].      Attend all scheduled appointments with your outpatient providers. Call at least 24 hours in advance if you need to reschedule an appointment to ensure continued access to your outpatient providers.   Major Treatments, Procedures and Findings: You were provided with: a psychiatric assessment, assessed for medical stability, medication evaluation and/or management, individual therapy, art therapy, CD evaluation/assessment and milieu management.    Symptoms to Report: If you experience any of the following symptoms please report them right away to your provider or to family/friends; feeling more aggressive, increased confusion, losing more sleep, mood getting worse or thoughts of suicide.    Early warning signs can include: Early warning signs that could signal a potential relapse could include but not limited to the following; increased depression or anxiety sleep disturbances increased thoughts or behaviors of suicide or self-harm  increased unusual thinking, such as paranoia or hearing voices.    Safety and  Wellness: Ask your support system to help you reduce your access to items that could harm yourself or others. If there is a concern for safety, call 911..    Resources:    Crisis Intervention: 278.835.2655 or 991-761-3641 (TTY: 753.499.1164).  Call anytime for help.  National [place] on Mental Illness (www.[place].org): 438.638.7525 or 009-539-9001.  Suicide Awareness Voices of Education (SAVE) (www.save.org): 262-231-VYQQ (2514)  National Suicide Prevention Line (www.mentalhealthmn.org): 391-204-OGHC (1760)  Mental Health Consumer/Survivor Network of [place] (www.mhcsn.net): 947.715.6462 or 975-753-5401  Mental Health Association of [place] (www.mentalhealth.org): 483.898.2603 or 260-531-0959    The treatment team has appreciated the opportunity to work with you. Raissa,  please take care and make your recovery a daily recovery. If you have any questions or concerns our unit number is [phone].

## 2017-05-26 NOTE — DISCHARGE SUMMARY
"Psychiatric Discharge Summary    Raissa Ruth MRN# [id number]   Age: 21 year old YOB: 1995     Date of Admission:  5/23/2017  Date of Discharge:  5/26/2017  Admitting Physician:  Shamar Henderson MD  Discharge Physician:  Debra Naegele APRN, CNS (Contact: [phone])         Event Leading to Hospitalization:   Raissa Ruth is a 21-year-old single  female who was transferred from [place] on a 72-hour hold.  The patient states she has been crying for weeks.  She reports that she broke up with her boyfriend 2 months ago.  She does not remember any triggers happening prior to going to the ER at [place].  The patient stated, \" My mother said I had to go to the hospital.\"  Upon arrival to the unit, the patient was threatening, stating \"I am going to flip fucking shit if I don't get my anxiety meds. I need Klonopin now.\"  The patient was threatening on the unit.  She stated her outside doctor is taking her off Klonopin and then referred to her outpatient provider as a bitch.  The patient states that she used op her Klonopin prescription 2 weeks early and her outpatient provider thought she was abusing Klonopin.  The patient received Risperdal 0.5 mg and Zyprexa 10 mg and then was able to calm.        See Admission note by Debra Naegele APRN, CNS on 5/26/2017 for additional details.          DIagnoses:   1.  Bipolar disorder.   2.  Borderline personality disorder.   3.  Cannabis use disorder, moderate.   4.  Amphetamine use disorder, moderate.   5.  Benzodiazepine use disorder, moderate.   6.  Oppositional defiant disorder  (historical)  7.  Eating disorder (in remission)   8.  ADHD (historical)         Labs:     Results for orders placed or performed during the hospital encounter of 05/23/17   Comprehensive metabolic panel   Result Value Ref Range    Sodium 143 133 - 144 mmol/L    Potassium 4.0 3.4 - 5.3 mmol/L    Chloride 110 (H) 94 - 109 mmol/L    Carbon Dioxide 28 20 - 32 mmol/L    " Anion Gap 5 3 - 14 mmol/L    Glucose 91 70 - 99 mg/dL    Urea Nitrogen 13 7 - 30 mg/dL    Creatinine 0.77 0.52 - 1.04 mg/dL    GFR Estimate >90  Non  GFR Calc   >60 mL/min/1.7m2    GFR Estimate If Black >90   GFR Calc   >60 mL/min/1.7m2    Calcium 9.2 8.5 - 10.1 mg/dL    Bilirubin Total 0.3 0.2 - 1.3 mg/dL    Albumin 3.8 3.4 - 5.0 g/dL    Protein Total 6.9 6.8 - 8.8 g/dL    Alkaline Phosphatase 50 40 - 150 U/L    ALT 13 0 - 50 U/L    AST 10 0 - 45 U/L   TSH with free T4 reflex   Result Value Ref Range    TSH 0.83 0.40 - 4.00 mU/L   CBC with platelets differential   Result Value Ref Range    WBC 3.8 (L) 4.0 - 11.0 10e9/L    RBC Count 4.60 3.8 - 5.2 10e12/L    Hemoglobin 13.7 11.7 - 15.7 g/dL    Hematocrit 41.1 35.0 - 47.0 %    MCV 89 78 - 100 fl    MCH 29.8 26.5 - 33.0 pg    MCHC 33.3 31.5 - 36.5 g/dL    RDW 12.8 10.0 - 15.0 %    Platelet Count 222 150 - 450 10e9/L    Diff Method Automated Method     % Neutrophils 40.2 %    % Lymphocytes 44.5 %    % Monocytes 9.7 %    % Eosinophils 5.3 %    % Basophils 0.3 %    % Immature Granulocytes 0.0 %    Nucleated RBCs 0 0 /100    Absolute Neutrophil 1.5 (L) 1.6 - 8.3 10e9/L    Absolute Lymphocytes 1.7 0.8 - 5.3 10e9/L    Absolute Monocytes 0.4 0.0 - 1.3 10e9/L    Absolute Eosinophils 0.2 0.0 - 0.7 10e9/L    Absolute Basophils 0.0 0.0 - 0.2 10e9/L    Abs Immature Granulocytes 0.0 0 - 0.4 10e9/L    Absolute Nucleated RBC 0.0    Lipid profile   Result Value Ref Range    Cholesterol 130 <200 mg/dL    Triglycerides 51 <150 mg/dL    HDL Cholesterol 49 (L) >49 mg/dL    LDL Cholesterol Calculated 71 <100 mg/dL    Non HDL Cholesterol 81 <130 mg/dL   Internal Medicine Hospitalist Psych Adult IP Consult - YOUNG ADULT: Patient to be seen: Routine within 24 hrs; Call back #: [phone] Deb Naegele; H&P came form outside hospital; Consultant may enter orders: Yes    Narrative    Joaquin Painting PA-C     5/24/2017  3:55 PM  Medicine consult dictated.              Consults:   Internal Medicine consult for H & P on 5/24/2017    HISTORY OF PRESENT ILLNESS:  Raissa Ruth is a 21-year-old female with a history of depression admitted to [facility] for suicidal ideation.  Reportedly, the patient lives with her mother and states daily suicidal ideation and threats to kill her parents as well.  An Internal Medicine consultation was ordered by Dr. Henderson's team for a general medical evaluation.  At this time, Raissa admits to cutting herself intentionally approximately 2 weeks ago and has a residual scabbed over laceration on left forearm, however, denies fever, chills, chest pain, shortness of breath, abdominal pain, nausea, bowel or bladder concerns or other skin problems.       PAST MEDICAL HISTORY:   1.  Psychiatric history per Dr. Henderson's team.   2.  Denies history of major medical problems including cardiopulmonary disease, hypertension and diabetes.       PAST SURGICAL HISTORY:   1.  Appendectomy.   2.  [place] teeth extraction.       ADMISSION MEDICATIONS:  Reviewed as listed in medication reconciliation list.       ALLERGIES:  No known drug allergies.       SOCIAL HISTORY:  Single.  No children.  Currently not in school.  Lives in [place].  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  States she smokes marijuana daily.  States last used alcohol 2 days ago.  Smokes on average 10-15 cigarettes daily, states whatever she can get her hands on, other illicit drugs she will do it.       FAMILY HISTORY:  Reviewed and noncontributory.       REVIEW OF SYSTEMS:  Ten-point review of systems negative except as stated above in history of present illness.       PHYSICAL EXAMINATION:   GENERAL:  Healthy-appearing young woman in no acute distress.   VITAL SIGNS:  Stable.  Temperature afebrile, pulse in the 90s, blood pressure 114/70s.   HEENT:  Negative.   NECK:  Supple, no cervical adenopathy or thyromegaly.   LUNGS:  Clear.   CARDIOVASCULAR:  Regular rate and  rhythm, no murmurs appreciated.   ABDOMEN:  Soft, nontender.   EXTREMITIES:  No edema.   SKIN:  Reveals a scabbed over laceration on her left proximal forearm without signs of bleeding or infection.  Rest of exam negative.   NEUROLOGIC:  She is awake, alert and oriented x3.  No acute focal deficits noted.       LABORATORY DATA:  From outside hospital, urine drug screen positive for amphetamines, benzodiazepines, and THC.  Otherwise CBC with differential and basic metabolic panel normal.  Alcohol level negative.  Urine pregnancy test negative.       IMPRESSION:   1.  Depression and behavioral concerns per Dr. Henderson's team.   2.  Acute self-inflicted left forearm laceration, superficial and appears to be healing well without signs of infection.   3.  Otherwise, in overall normally good physical health.       PLAN:  No medical intervention indicated at this time.  The patient is medically stable and I will follow her up during her stay for any intercurrent medical issues.           EUGENIO LARA PA-C               Hospital Course:   Raissa Ruth was admitted to [facility] with attending Shamar Henderson MD on a 72 hour mental health hold. The patient was placed under status 15 (15 minute checks) to ensure patient safety.     Patient reports she was crying due to a break up with he boyfriend. She is currently living with her parents. She is followed by a psychiatrist and therapist in the community. Patient reports mood instability and intense emotions which result in her having poor interpersonal relationships. She was started on Lithium. Patient was encouraged to go back to DBT to help her develop skills to manage her intense emotions.     Raissa Ruth did participate in groups and was visible in the milieu.The patient's symptoms of aggression improved. She continues to struggle with her urges to use mood altering substances. She presents with a stable mood and organized thinking. She denies having any  suicidal ideation. She completed a CD assessment on 5/26/2017. She will return to her parents house with the intention of following up with the CD assessors recommendations. She is at high risk of of relapse due to her past psychiatric history. She has protective factors of supportive parents that mitigate her risk.     Raissa Ruth was released to home. At the time of discharge Raissa Ruth was determined to not be a danger to herself or others.          Discharge Medications:     Current Discharge Medication List      START taking these medications    Details   hydrOXYzine (ATARAX) 25 MG tablet Take 1-2 tablets (25-50 mg) by mouth every 4 hours as needed for anxiety  Qty: 120 tablet, Refills: 1    Associated Diagnoses: Anxiety      lithium (ESKALITH/LITHOBID) 300 MG CR tablet Take 2 tablets (600 mg) by mouth At Bedtime  Qty: 60 tablet, Refills: 1    Associated Diagnoses: Bipolar I disorder, most recent episode depressed (H)      risperiDONE (RISPERDAL) 0.5 MG tablet Take 1 tablet (0.5 mg) by mouth 2 times daily  Qty: 60 tablet, Refills: 1    Associated Diagnoses: Bipolar I disorder, most recent episode depressed (H)         CONTINUE these medications which have NOT CHANGED    Details   LANsoprazole (PREVACID) 15 MG capsule Refills: 2      medroxyPROGESTERone (DEPO-PROVERA) 150 MG/ML injection Inject 1 mL (150 mg) into the muscle every 3 months  Qty: 3 mL, Refills: 3    Associated Diagnoses: Other and unspecified ovarian cyst; Unspecified symptom associated with female genital organs         STOP taking these medications       clonazePAM (KLONOPIN) 1 MG tablet Comments:   Reason for Stopping:         OXcarbazepine (TRILEPTAL) 150 MG tablet Comments:   Reason for Stopping:         buPROPion (WELLBUTRIN XL) 150 MG 24 hr tablet Comments:   Reason for Stopping:         DULoxetine (CYMBALTA) 60 MG capsule Comments:   Reason for Stopping:                    Psychiatric Examination:   Appearance:  awake, alert and  adequately groomed  Attitude:  cooperative  Eye Contact:  good  Mood:  better  Affect:  appropriate and in normal range  Speech:  clear, coherent  Psychomotor Behavior:  no evidence of tardive dyskinesia, dystonia, or tics  Thought Process:  linear  Associations:  no loose associations  Thought Content:  no evidence of suicidal ideation or homicidal ideation  Insight:  limited  Judgment:  limited  Oriented to:  time, person, and place  Attention Span and Concentration:  fair  Recent and Remote Memory:  intact  Language: Able to name objects, Able to repeat phrases and Able to read and write  Fund of Knowledge: adequate  Muscle Strength and Tone: normal  Gait and Station: Normal         Discharge Plan:   Summary: You were admitted on 5/23/2017 to Station [address] [place] [address] for increase in depression and suicidal ideation.    You were treated by Debra Naegele, APRN, CNS and discharged on 5/26/17. .      Disposition: Discharged to home     Principal Diagnosis:  Bipolar Disorder; Cannabis use disorder, moderate; Amphetamine use disorder, moderate; Benzodiazepine use disorder, moderate.       Psychiatry Follow-up Appointment:  5/31/17 @ 2:30pm:  Psychiatrist/Dayan Akers @ [facility]:  [address] [place], [place].  # [phone].     Therapist:  SAIMA Rodriguez therapist @ [facility]  Individual Therapist:  Patience Piña @ [facility], [place]:    Appointments are scheduled      Chemical Dependency Referrals have been made to the following treatment facilities:  [place]:  [facility]:  [place], [place].       Attend all scheduled appointments with your outpatient providers. Call at least 24 hours in advance if you need to reschedule an appointment to ensure continued access to your outpatient providers.   Major Treatments, Procedures and Findings: You were provided with: a psychiatric assessment, assessed for medical stability, medication evaluation and/or  management, individual therapy, art therapy, CD evaluation/assessment and milieu management.     Symptoms to Report: If you experience any of the following symptoms please report them right away to your provider or to family/friends; feeling more aggressive, increased confusion, losing more sleep, mood getting worse or thoughts of suicide.     Early warning signs can include: Early warning signs that could signal a potential relapse could include but not limited to the following; increased depression or anxiety sleep disturbances increased thoughts or behaviors of suicide or self-harm  increased unusual thinking, such as paranoia or hearing voices.     Safety and Wellness: Ask your support system to help you reduce your access to items that could harm yourself or others. If there is a concern for safety, call 911..     Resources:    Crisis Intervention: 135.899.3943 or 661-536-0403 (TTY: 805.700.2088).  Call anytime for help.  National [place] [employer] Mental Illness (www.[place].оль[place].org): 900.949.8392 or 847-853-7875.  Suicide Awareness Voices of Education (SAVE) (www.save.org): 906-762-WBHP (6483)  National Suicide Prevention Line (www.mentalhealthmn.org): 930-480-PXIZ (1094)  Mental Health Consumer/Survivor Network of [place] (www.mhcsn.net): 739.152.6697 or 664-894-4621  Mental Health Association of [place] (www.mentalhealth.org): 711.984.1314 or 389-563-9877     The treatment team has appreciated the opportunity to work with you. Raissa,  please take care and make your recovery a daily recovery. If you have any questions or concerns our unit number is [phone].   Attestation:  The patient has been seen and evaluated by me,  Debra Naegele APRN, CNS  Discharge time > 30 minutes

## 2017-05-26 NOTE — PROGRESS NOTES
Sheree from [facility] will come to meet patient today @ 9am to complete CD Assessment.

## 2017-05-26 NOTE — PROGRESS NOTES
This writer completed the [facility] Relapse Prevention Plan with patient.  Copy of this plan was put in patient's chart and billing time recorded on billing sheet.

## 2017-05-26 NOTE — PLAN OF CARE
Problem: Depressive Symptoms  Goal: Depressive Symptoms  Signs and symptoms of listed problems will be absent or manageable.  Patient, prior to discharge, will:  -verbalize decrease in depressive signs/symptoms  -verbalize a decrease in anxiety   -verbalize an understanding of medication regimen   -verbalize absence of SI/SIB   -develop a safety plan  -identify a support system   -will participate in coordination of discharge planning    Patient, prior to discharge, will:  -verbalize decrease in depressive signs/symptoms  -verbalize a decrease in anxiety   -verbalize an understanding of medication regimen   -verbalize absence of SI/SIB   -develop a safety plan  -identify a support system   -will participate in coordination of discharge planning    To promote safety/ mental health    Patient identified the following   Triggers:    Wellness Strategies:    Warning Signs:      Feedback (people they would like to receive feedback from if early warning signs):  Friend(s)    Family(s):     Partner/Spouse:     Support Group Member(s):     Co-Worker(s):     Taking Action:    Ways to [place]:      Self-Reflection & Planning.  Assessed patient s progress completing forms related to Illness Management Recovery (including Personal Plan of Care, Adult Coping Plan, and My Support and Coping Plan) and assisted as needed.    Encouraged patient to continue to consider triggers, wellness strategies, early warning signs, feedback from others, actions to take to prevent relapse, and coping strategies as part of a plan to remain well after leaving the hospital.             Outcome: Adequate for Discharge Date Met:  05/26/17  Patient discharged to home with father without incident. Patient was given all of her belongings and medications at discharge. Patient denies experiencing any suicidal ideation or urges to self-harm. Patient is working on developing healthy coping skills and states she is feeling motivated to stay sober and go to  treatment so she does not return to inpatient care.

## 2017-08-07 ENCOUNTER — COMMUNICATION - HEALTHEAST (OUTPATIENT)
Dept: BEHAVIORAL HEALTH | Facility: CLINIC | Age: 22
End: 2017-08-07

## 2017-08-07 DIAGNOSIS — F43.22 ADJUSTMENT DISORDER WITH ANXIETY: ICD-10-CM

## 2017-08-07 DIAGNOSIS — F31.30 BIPOLAR I DISORDER, MOST RECENT EPISODE DEPRESSED (H): ICD-10-CM

## 2017-08-07 DIAGNOSIS — F31.4 BIPOLAR DISORDER, CURRENT EPISODE DEPRESSED, SEVERE, WITHOUT PSYCHOTIC FEATURES (H): ICD-10-CM

## 2018-02-05 ENCOUNTER — TRANSFERRED RECORDS (OUTPATIENT)
Dept: HEALTH INFORMATION MANAGEMENT | Facility: CLINIC | Age: 23
End: 2018-02-05

## 2018-02-06 ENCOUNTER — HOSPITAL ENCOUNTER (INPATIENT)
Facility: CLINIC | Age: 23
LOS: 3 days | Discharge: HOME | DRG: 885 | End: 2018-02-09
Attending: PSYCHIATRY & NEUROLOGY | Admitting: PSYCHIATRY & NEUROLOGY
Payer: COMMERCIAL

## 2018-02-06 DIAGNOSIS — F99 INSOMNIA DUE TO OTHER MENTAL DISORDER: ICD-10-CM

## 2018-02-06 DIAGNOSIS — F41.1 GENERALIZED ANXIETY DISORDER: Primary | ICD-10-CM

## 2018-02-06 DIAGNOSIS — F32.2 MAJOR DEPRESSIVE DISORDER, SINGLE EPISODE, SEVERE (H): ICD-10-CM

## 2018-02-06 DIAGNOSIS — F51.05 INSOMNIA DUE TO OTHER MENTAL DISORDER: ICD-10-CM

## 2018-02-06 DIAGNOSIS — F31.32 BIPOLAR AFFECTIVE DISORDER, CURRENTLY DEPRESSED, MODERATE (H): ICD-10-CM

## 2018-02-06 PROBLEM — R45.851 SUICIDAL IDEATION: Status: ACTIVE | Noted: 2018-02-06

## 2018-02-06 PROCEDURE — 99207 ZZC NON-BILLABLE SERV PER CHARTING: CPT | Performed by: PHYSICIAN ASSISTANT

## 2018-02-06 PROCEDURE — 99221 1ST HOSP IP/OBS SF/LOW 40: CPT | Mod: AI | Performed by: CLINICAL NURSE SPECIALIST

## 2018-02-06 PROCEDURE — 25000132 ZZH RX MED GY IP 250 OP 250 PS 637: Performed by: CLINICAL NURSE SPECIALIST

## 2018-02-06 PROCEDURE — 12400007 ZZH R&B MH INTERMEDIATE UMMC

## 2018-02-06 RX ORDER — NICOTINE 21 MG/24HR
1 PATCH, TRANSDERMAL 24 HOURS TRANSDERMAL DAILY
Status: DISCONTINUED | OUTPATIENT
Start: 2018-02-06 | End: 2018-02-09 | Stop reason: HOSPADM

## 2018-02-06 RX ORDER — RISPERIDONE 1 MG/1
1 TABLET ORAL DAILY
Status: DISCONTINUED | OUTPATIENT
Start: 2018-02-06 | End: 2018-02-09 | Stop reason: HOSPADM

## 2018-02-06 RX ORDER — HYDROXYZINE HYDROCHLORIDE 25 MG/1
25-50 TABLET, FILM COATED ORAL EVERY 4 HOURS PRN
Status: DISCONTINUED | OUTPATIENT
Start: 2018-02-06 | End: 2018-02-08

## 2018-02-06 RX ORDER — ALUMINA, MAGNESIA, AND SIMETHICONE 2400; 2400; 240 MG/30ML; MG/30ML; MG/30ML
30 SUSPENSION ORAL EVERY 4 HOURS PRN
Status: DISCONTINUED | OUTPATIENT
Start: 2018-02-06 | End: 2018-02-09 | Stop reason: HOSPADM

## 2018-02-06 RX ORDER — BISACODYL 10 MG
10 SUPPOSITORY, RECTAL RECTAL DAILY PRN
Status: DISCONTINUED | OUTPATIENT
Start: 2018-02-06 | End: 2018-02-09 | Stop reason: HOSPADM

## 2018-02-06 RX ORDER — LITHIUM CARBONATE 300 MG/1
300 TABLET, FILM COATED, EXTENDED RELEASE ORAL DAILY
Status: DISCONTINUED | OUTPATIENT
Start: 2018-02-06 | End: 2018-02-09 | Stop reason: HOSPADM

## 2018-02-06 RX ORDER — LITHIUM CARBONATE 300 MG/1
600 TABLET, FILM COATED, EXTENDED RELEASE ORAL AT BEDTIME
Status: DISCONTINUED | OUTPATIENT
Start: 2018-02-06 | End: 2018-02-09 | Stop reason: HOSPADM

## 2018-02-06 RX ORDER — ACETAMINOPHEN 325 MG/1
650 TABLET ORAL EVERY 4 HOURS PRN
Status: DISCONTINUED | OUTPATIENT
Start: 2018-02-06 | End: 2018-02-09 | Stop reason: HOSPADM

## 2018-02-06 RX ORDER — RISPERIDONE 1 MG/1
2 TABLET ORAL AT BEDTIME
Status: DISCONTINUED | OUTPATIENT
Start: 2018-02-06 | End: 2018-02-09 | Stop reason: HOSPADM

## 2018-02-06 RX ORDER — OLANZAPINE 10 MG/2ML
5 INJECTION, POWDER, FOR SOLUTION INTRAMUSCULAR
Status: DISCONTINUED | OUTPATIENT
Start: 2018-02-06 | End: 2018-02-09 | Stop reason: HOSPADM

## 2018-02-06 RX ORDER — BUSPIRONE HYDROCHLORIDE 5 MG/1
10 TABLET ORAL 3 TIMES DAILY
Status: DISCONTINUED | OUTPATIENT
Start: 2018-02-06 | End: 2018-02-08

## 2018-02-06 RX ORDER — BUPROPION HYDROCHLORIDE 300 MG/1
300 TABLET ORAL DAILY
Status: DISCONTINUED | OUTPATIENT
Start: 2018-02-06 | End: 2018-02-09 | Stop reason: HOSPADM

## 2018-02-06 RX ORDER — TRAZODONE HYDROCHLORIDE 50 MG/1
50 TABLET, FILM COATED ORAL
Status: DISCONTINUED | OUTPATIENT
Start: 2018-02-06 | End: 2018-02-09 | Stop reason: HOSPADM

## 2018-02-06 RX ORDER — OLANZAPINE 5 MG/1
5 TABLET ORAL
Status: DISCONTINUED | OUTPATIENT
Start: 2018-02-06 | End: 2018-02-09 | Stop reason: HOSPADM

## 2018-02-06 RX ADMIN — RISPERIDONE 2 MG: 1 TABLET ORAL at 20:44

## 2018-02-06 RX ADMIN — NICOTINE POLACRILEX 2 MG: 2 GUM, CHEWING ORAL at 16:33

## 2018-02-06 RX ADMIN — NICOTINE POLACRILEX 2 MG: 2 GUM, CHEWING ORAL at 12:52

## 2018-02-06 RX ADMIN — NICOTINE 1 PATCH: 21 PATCH, EXTENDED RELEASE TRANSDERMAL at 11:22

## 2018-02-06 RX ADMIN — LITHIUM CARBONATE 600 MG: 300 TABLET, EXTENDED RELEASE ORAL at 20:44

## 2018-02-06 RX ADMIN — BUSPIRONE HYDROCHLORIDE 10 MG: 5 TABLET ORAL at 20:44

## 2018-02-06 RX ADMIN — BUPROPION HYDROCHLORIDE 300 MG: 300 TABLET, FILM COATED, EXTENDED RELEASE ORAL at 12:52

## 2018-02-06 RX ADMIN — FLUOXETINE 40 MG: 20 CAPSULE ORAL at 12:52

## 2018-02-06 RX ADMIN — RISPERIDONE 1 MG: 1 TABLET ORAL at 12:52

## 2018-02-06 RX ADMIN — BUSPIRONE HYDROCHLORIDE 10 MG: 5 TABLET ORAL at 15:03

## 2018-02-06 RX ADMIN — NICOTINE POLACRILEX 2 MG: 2 GUM, CHEWING ORAL at 20:45

## 2018-02-06 RX ADMIN — LITHIUM CARBONATE 300 MG: 300 TABLET, EXTENDED RELEASE ORAL at 12:52

## 2018-02-06 ASSESSMENT — ACTIVITIES OF DAILY LIVING (ADL)
LAUNDRY: WITH SUPERVISION
ORAL_HYGIENE: INDEPENDENT
GROOMING: INDEPENDENT
GROOMING: INDEPENDENT
ORAL_HYGIENE: INDEPENDENT
DRESS: INDEPENDENT
DRESS: INDEPENDENT

## 2018-02-06 NOTE — CONSULTS
Internal Medicine Initial Visit    Raissa Ruth MRN# [id number]   Age: 22 year old YOB: 1995   Date of Admission: 2/6/2018     Reason for consult: General Medical Evaluation       Requesting physician Debra Naegele, APRN CNP      SUBJECTIVE   HPI:   Raissa Ruth is a 22 year old female with history of bipolar disorder, anxiety, depression, and alcohol abuse admitted to station [facility] for suicidal ideation. Medicine was consulted to perform a general medical evaluation.    Patient presented to [facility] ED with suicidal ideation with plans to cut herself. Also voiced desire to relapse on alcohol. Patient has not had any of her medications for ~2 weeks due to not obtaining refills. Denies any recent alcohol or illicit substance use.    Currently, patient is feeling okay. Endorses intermittent headaches which are relieved with tylenol. Notes she is no longer taking depo-provera injections. Reports eating and drinking well. Otherwise no physical complaints, denies fevers, chills, dizziness, chest pain, SOB, abdominal pain, nausea, vomiting, dysuria, diarrhea, constipation, or peripheral edema.   Past Medical History:     Past Medical History:   Diagnosis Date     Anxiety      Bipolar 1 disorder (H)      Depression      Substance abuse       Reviewed & updated in [employer].     Past Surgical History:      Past Surgical History:   Procedure Laterality Date     APPENDECTOMY  2011     HC TOOTH EXTRACTION W/FORCEP        Reviewed & updated in Epic.     Medications prior to admission:     Prior to Admission Medications   Prescriptions Last Dose Informant Patient Reported? Taking?   LANsoprazole (PREVACID) 15 MG capsule   Yes No   hydrOXYzine (ATARAX) 25 MG tablet   No No   Sig: Take 1-2 tablets (25-50 mg) by mouth every 4 hours as needed for anxiety   lithium (ESKALITH/LITHOBID) 300 MG CR tablet   No No   Sig: Take 2 tablets (600 mg) by mouth At Bedtime   medroxyPROGESTERone (DEPO-PROVERA) 150 MG/ML injection   No No    Sig: Inject 1 mL (150 mg) into the muscle every 3 months   risperiDONE (RISPERDAL) 0.5 MG tablet   No No   Sig: Take 1 tablet (0.5 mg) by mouth 2 times daily      Facility-Administered Medications: None         Allergies:   No Known Allergies      Social History:   Tobacco Use: Reports smoking 1/2 ppd  Alcohol Use: Declines  Illicit Drug Use: Declines  STI Testing: Declines at this time     Family History:     Family History   Problem Relation Age of Onset     Breast Cancer Maternal Grandmother      Breast Cancer Paternal Grandmother      Depression Mother       Reviewed & updated in Epic.     Review of Systems:   Ten point review of systems negative except as stated above in History of Present Illness.    OBJECTIVE   Physical Exam:   There were no vitals taken for this visit.  General: Well-appearing  female resting comfortably in bed, NAD.  HEENT: NC/AT. Anicteric sclera. Mucous membranes moist.  Neck: Supple  Cardiovascular: RRR. S1/S2. No murmurs appreciated.  Lungs: Normal respiratory effort on RA. Lungs CTAB without wheezes, rales, or rhonchi.  Abdomen: Soft, non-tender, and nondistended with normoactive bowel sounds.  Extremities: Warm & well perfused. No peripheral edema.  Skin: No rashes, jaundice, or lesions on exposed areas of skin.  Neurologic: A&O X 3. Answers questions appropriately. Moves all extremities symmetrically.     Laboratory Data:   No pertinent laboratory data obtained this admission.      Assessment and Plan/Recommendations:   Raissa Ruth is a 22 year old female with history of bipolar disorder, anxiety, depression, and alcohol abuse admitted to station [facility] for suicidal ideation.    Bipolar Disorder, Depression, Anxiety, Suicidal Ideation. Presented to [facility] ED with plans to cut herself or relapse on alcohol. U tox in ED negative. Admission labs ordered for AM per psychiatry.  - Management per Psychiatry    Medicine will sign off at this time. Please page the Internal Medicine job  code pager for any intercurrent medical issues which arise. Thank you for the opportunity to be a part of this patient's care.    Luly Manley PA-C  Hospitalist Service  [phone]

## 2018-02-06 NOTE — IP AVS SNAPSHOT
[facility]    [facility] [address]    [address] [facility] [address]    Phone:  [phone]                                       After Visit Summary   2/6/2018    Raissa Ruth    MRN: [id number]           After Visit Summary Signature Page     I have received my discharge instructions, and my questions have been answered. I have discussed any challenges I see with this plan with the nurse or doctor.    ..........................................................................................................................................  Patient/Patient Representative Signature      ..........................................................................................................................................  Patient Representative Print Name and Relationship to Patient    ..................................................               ................................................  Date                                            Time    ..........................................................................................................................................  Reviewed by Signature/Title    ...................................................              ..............................................  Date                                                            Time

## 2018-02-06 NOTE — PROGRESS NOTES
02/06/18 1502   Patient Belongings   Disposition of meds  Other (see comment)   Patient Belongings jewelry;[employer] item;cell phone/electronics   Disposition of Belongings Locker   Belongings Search Yes   Clothing Search Yes   Second Staff Kristen   General Info Comment Meds given to RN     Meds given to RN in envelope [id number]    Coat, shoes, sweatpants, sweatshirt, tshirt, hat, socks, 2 necklaces (one is a rosary), wallet cell phone,     Sent to security in envelope [id number]: $5 bill, many coins, [employer] gift card, go to card, visa debit ending in 0314  A               Admission:  I am responsible for any personal items that are not sent to the safe or pharmacy.  [facility] is not responsible for loss, theft or damage of any property in my possession.    Signature:  _________________________________ Date: _______  Time: _____                                              Staff Signature:  ____________________________ Date: ________  Time: _____      2nd Staff person, if patient is unable/unwilling to sign:    Signature: ________________________________ Date: ________  Time: _____     Discharge:  [facility] has returned all of my personal belongings:    Signature: _________________________________ Date: ________  Time: _____                                          Staff Signature:  ____________________________ Date: ________  Time: _____

## 2018-02-06 NOTE — IP AVS SNAPSHOT
YOUNG ADULT INPATIENT MENTAL [facility]: [phone]                                              INTERAGENCY TRANSFER FORM - LAB / IMAGING / EKG / EMG RESULTS   2018                    Hospital Admission Date: 2018  EMMA COOLEY   : 1995  Sex: Female        Attending Provider: Shamar Henderson MD     Allergies:  No Known Allergies    Infection:  None   Service:  MENTAL HEALT    Ht:  --   Wt:  68.6 kg (151 lb 3.2 oz)   Admission Wt:  68.6 kg (151 lb 3.2 oz)    BMI:  --   BSA:  --            Patient PCP Information     Provider PCP Type    [facility]         Lab Results - 3 Days      Lipid panel [[id number]]  Resulted: 18, Result status: Final result    Ordering provider: Naegele, Debra Ann, APRN CNS  18 003 Resulting lab: [facility]    Specimen Information    Type Source Collected On   Blood  18 0750          Components       Value Reference Range Flag Lab   Cholesterol 149 <200 mg/dL  13   Triglycerides 72 <150 mg/dL  13   HDL Cholesterol 52 >49 mg/dL  13   LDL Cholesterol Calculated 83 <100 mg/dL  13   Comment:  Desirable:       <100 mg/dl   Non HDL Cholesterol 97 <130 mg/dL  13            TSH with free T4 reflex and/or T3 as indicated [[id number]]  Resulted: 18, Result status: Final result    Ordering provider: Naegele, Debra Ann, APRN CNS  18 0030 Resulting lab: [facility]    Specimen Information    Type Source Collected On   Blood  18 0750          Components       Value Reference Range Flag Lab   TSH 1.60 0.40 - 4.00 mU/L  13            Comprehensive metabolic panel [[id number]]  Resulted: 18, Result status: Final result    Ordering provider: Naegele, Debra Ann, APRN CNS  18 0030 Resulting lab: [facility]    Specimen Information    Type Source Collected On   Blood  18 0750          Components        Value Reference Range Flag Lab   Sodium 141 133 - 144 mmol/L  13   Potassium 4.0 3.4 - 5.3 mmol/L  13   Chloride 109 94 - 109 mmol/L  13   Carbon Dioxide 24 20 - 32 mmol/L  13   Anion Gap 8 3 - 14 mmol/L  13   Glucose 90 70 - 99 mg/dL  13   Urea Nitrogen 13 7 - 30 mg/dL  13   Creatinine 0.69 0.52 - 1.04 mg/dL  13   GFR Estimate >90 >60 mL/min/1.7m2  13   Comment:  Non  GFR Calc   GFR Estimate If Black >90 >60 mL/min/1.7m2  13   Comment:  African American GFR Calc   Calcium 8.8 8.5 - 10.1 mg/dL  13   Bilirubin Total 0.2 0.2 - 1.3 mg/dL  13   Albumin 3.5 3.4 - 5.0 g/dL  13   Protein Total 7.0 6.8 - 8.8 g/dL  13   Alkaline Phosphatase 66 40 - 150 U/L  13   ALT 25 0 - 50 U/L  13   AST 13 0 - 45 U/L  13            CBC with platelets differential [[id number]]  Resulted: 02/07/18 0828, Result status: Final result    Ordering provider: Naegele, Debra Ann, APRN CNS  02/07/18 0030 Resulting lab: [facility]    Specimen Information    Type Source Collected On   Blood  02/07/18 0750          Components       Value Reference Range Flag Lab   WBC 4.2 4.0 - 11.0 10e9/L  13   RBC Count 4.75 3.8 - 5.2 10e12/L  13   Hemoglobin 13.8 11.7 - 15.7 g/dL  13   Hematocrit 42.4 35.0 - 47.0 %  13   MCV 89 78 - 100 fl  13   MCH 29.1 26.5 - 33.0 pg  13   MCHC 32.5 31.5 - 36.5 g/dL  13   RDW 12.2 10.0 - 15.0 %  13   Platelet Count 209 150 - 450 10e9/L  13   Diff Method Automated Method   13   % Neutrophils 54.2 %  13   % Lymphocytes 30.6 %  13   % Monocytes 10.9 %  13   % Eosinophils 3.6 %  13   % Basophils 0.2 %  13   % Immature Granulocytes 0.5 %  13   Nucleated RBCs 0 0 /100  13   Absolute Neutrophil 2.3 1.6 - 8.3 10e9/L  13   Absolute Lymphocytes 1.3 0.8 - 5.3 10e9/L  13   Absolute Monocytes 0.5 0.0 - 1.3 10e9/L  13   Absolute Eosinophils 0.2 0.0 - 0.7 10e9/L  13   Absolute Basophils 0.0 0.0 - 0.2 10e9/L  13   Abs Immature Granulocytes 0.0 0 - 0.4 10e9/L  13   Absolute Nucleated RBC 0.0   13             Testing Performed By     Lab - Abbreviation Name Director Address Valid Date Range    13 - Unknown [facility] Unknown [address] [facility] [id number] 01/15/15 0916 - Present            Unresulted Labs     None      Encounter-Level Documents:     There are no encounter-level documents.      Order-Level Documents:     There are no order-level documents.

## 2018-02-06 NOTE — PLAN OF CARE
Problem: Patient Care Overview  Goal: Individualization & Mutuality  BEHAVIORAL TEAM DISCUSSION    Participants: 4A Provider: Debra Naegele, APRN, CNS and Dr. Emilia Guerra MD; 4A RN's: Edie Gordillo, RN and Shiela Alonso, RN; 4A CTC's: Bebeto Carlisle (CTC), Jackie Mead (CTC) and Jostin Chambers (Art Therapist).  Progress: No Change.  Continued Stay Criteria/Rationale: N/A  Medical/Physical: Deferred (see medical notes).  Precautions:    Behavioral Orders   Procedures     Code 1 - Restrict to Unit     Routine Programming     As clinically indicated     Status 15     Every 15 minutes.     Suicide precautions     Plan: The following services will be provided to the patient; psychiatric assessment, medication management, therapeutic milieu, individual and group support, art therapy, and skills/OT groups.   Rationale for change in precautions or plan: N/A

## 2018-02-06 NOTE — PROGRESS NOTES
"Raissa arrived on unit on 72 hour hold which expires Thursday at 1600. States she asked to be placed on hold in ED, and declines to sign in at this time. She was most recently on unit in May last year, and has been admitted a couple times before that. History of Bipolar Disorder, BPD, Substance abuse DOC: Methamphetamines, has been sober since June of last year, and is currently residing in sober housing.     She brought herself to the ER because \"I've been overwhelmed by everything lately. I've been doing the most I've ever done at once in my life.\" States she has been working two jobs for a total of 30-35 hours a week, in addition to attending an NA group for several hours 3 times a week and has a sponser. She states medication management has been difficult, as she needs monthly approval from her OP provider for refills which she forgets to request, and then forgets to refill the prescriptions. When taking consistently, she states they are helpful. She stopped taking them all together 2 weeks ago, and then restarted on Sunday. Lithium level taken in ER currently at 0.46.    States she's been feeling out of control last month 1/2, more irritable, with mood swings, and urges to relapse or to slit her wrists. \"I thought I would bring myself here so I don't ruin my life again. I also have good days where I'm happy to be alive, knowing that I have things worth living for.\" States she's been having great difficulty falling asleep at night, but is able to nap during day. States she has not been able to connect with her friends lately due to work which is also a stressor.     Declines flu shot.   "
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
## 2018-02-06 NOTE — IP AVS SNAPSHOT
MRN:[id number]                      After Visit Summary   2/6/2018    Raissa Ruth    MRN: [id number]           Patient Information     Date Of Birth          1995        About your hospital stay     You were admitted on:  February 6, 2018 You last received care in the:  [facility]    You were discharged on:  February 9, 2018       Who to Call     For medical emergencies, please call 911.  For non-urgent questions about your medical care, please call your primary care provider or clinic, [phone]          Attending Provider     Provider Specialty    Shamar Henderson MD Psychiatry       Primary Care Provider Office Phone # Fax #    [facility] [phone]      Further instructions from your care team       Behavioral Discharge Planning and Instructions      Summary: You were admitted on 2/6/2018 to [facility] [address] [place] [address] due to Suicidal Ideation.  You were treated by Debra Naegele and discharged on 02/09/2018 to Substance Abuse Treatment Program at [facility] with sober housing    Principal Diagnosis:   Bipolar disorder  Borderline personality disorder.   Cannabis use disorder, moderate, in remission.   Amphetamine use disorder, moderate, in remission.   Benzodiazepine use disorder, moderate, in remission.   History of oppositional defiant disorder.   History of eating disorder in remission.   History of attention deficit hyperactivity disorder.     Health Care Follow-up Appointments:   Medication Management  Date: 02/15/2018  Time: 10:00am      Provider: Dr. Dayan Akers  Address: [address] [place] [address]  Phone:[phone]   [facility] has faxed the Discharge Summary and AVS to this provider at Fax: [phone]      Therapy Appointment   Date: 02/13/2018  Time: 7:30AM      Provider: Mirna Lee  Address: [address] [place] [address]  Phone:[phone]          Attend all scheduled appointments  "with your outpatient providers. Call at least 24 hours in advance if you need to reschedule an appointment to ensure continued access to your outpatient providers.   Major Treatments, Procedures and Findings: You were provided with: a psychiatric assessment, assessed for medical stability, medication evaluation and/or management, group therapy, art therapy, milieu management, medical interventions and skills/OT groups.    Symptoms to Report: If you experience any of the following symptoms please report them right away to your provider or to family/friends; feeling more aggressive, increased confusion, losing more sleep, mood getting worse or thoughts of suicide.    Early warning signs can include: Early warning signs that could signal a potential relapse could include but not limited to the following; increased depression or anxiety sleep disturbances increased thoughts or behaviors of suicide or self-harm increased unusual thinking, such as paranoia or hearing voices.     Safety and Wellness:  Take all medicines as directed.  Make no changes unless your doctor suggests them. Follow treatment recommendations.  Refrain from alcohol and non-prescribed drugs. If there is a concern for safety, call 911.    Resources: Mental health crisis response for your [facility] is offered 24 hours a day, 7 days a week. A trained counselor will assess your current situation, offer support and counseling and connect you with local resources. Please call  [facility] Crisis (COPE) Response - Adult [phone]    Crisis Intervention: 531.953.1187 or 481-279-9029 (TTY: 800.178.2504).  Call anytime for help.  National [employer] on Mental Illness (www.mn.ольга.org): 729.491.5147 or 338-124-0945.  Suicide Awareness Voices of Education (SAVE) (www.save.org): 674-149-ZRAO (8329)  National Suicide Prevention Line (www.mentalhealthmn.org): 920-354-RZYC (1207)  Text 4 Life: txt \"LIFE\" to [address] for immediate support and crisis " "intervention  Crisis text line: Text \"START\" to 546-856. Free, confidential, .  Crisis Intervention: 993.384.6905 or 015-924-6326. Call anytime for help.     The treatment team has appreciated the opportunity to work with you. Raissa, please take care and make your recovery a daily recovery. If you have any questions or concerns our unit number is [phone].  You will be receiving a follow-up phone call within the next three days from a representative from behavioral health.  You have identified the best phone number to reach you as [phone] (home) [phone] (work)        Pending Results     No orders found from 2018 to 2018.            Admission Information     Date & Time Provider Department Dept. Phone    2018 Shmaar Henderson MD [place] [facility] [phone]      Your Vitals Were     Blood Pressure Pulse Temperature Respirations Weight BMI (Body Mass Index)    118/59 90 95.6  F (35.3  C) (Oral) 16 68.6 kg (151 lb 3.2 oz) 23.68 kg/m2      MyChart Information     [facility] lets you send messages to your doctor, view your test results, renew your prescriptions, schedule appointments and more. To sign up, go to [email or web address] . Click on \"Log in\" on the left side of the screen, which will take you to the Welcome page. Then click on \"Sign up Now\" on the right side of the page.     You will be asked to enter the access code listed below, as well as some personal information. Please follow the directions to create your username and password.     Your access code is: [email or web address]  Expires: 5/10/2018 12:14 PM     Your access code will  in 90 days. If you need help or a new code, please call your [facility] or [phone].        Care EveryWhere ID     This is your Care EveryWhere ID. This could be used by other organizations to access your [place] medical records  [id number]        Equal Access to Services     OFELIA LOFTON AH: Marine nails " Sodestin, wamertda luqadaha, qaybta kaalmada pennie, nikko bernabejacobo enzo. So [facility] [phone].    ATENCIÓN: Si taryn ovalle, tiene a byers disposición servicios gratuitos de asistencia lingüística. Teodoro al [phone].    We comply with applicable federal civil rights laws and [place] laws. We do not discriminate on the basis of race, color, national origin, age, disability, sex, sexual orientation, or gender identity.               Review of your medicines      START taking        Dose / Directions    buPROPion 300 MG 24 hr tablet   Commonly known as:  WELLBUTRIN XL   Used for:  Generalized anxiety disorder        Dose:  300 mg   Take 1 tablet (300 mg) by mouth daily   Quantity:  30 tablet   Refills:  1       busPIRone 15 MG tablet   Commonly known as:  BUSPAR   Used for:  Generalized anxiety disorder        Dose:  15 mg   Take 1 tablet (15 mg) by mouth 3 times daily   Quantity:  90 tablet   Refills:  1       FLUoxetine 20 MG capsule   Commonly known as:  PROzac   Used for:  Major depressive disorder, single episode, severe (H)        Dose:  20 mg   Take 1 capsule (20 mg) by mouth daily   Quantity:  30 capsule   Refills:  1       traZODone 50 MG tablet   Commonly known as:  DESYREL   Used for:  Insomnia due to other mental disorder        Dose:  50 mg   Take 1 tablet (50 mg) by mouth nightly as needed for sleep   Quantity:  30 tablet   Refills:  1         CONTINUE these medicines which may have CHANGED, or have new prescriptions. If we are uncertain of the size of tablets/capsules you have at home, strength may be listed as something that might have changed.        Dose / Directions    hydrOXYzine 50 MG tablet   Commonly known as:  ATARAX   This may have changed:    - medication strength  - how much to take   Used for:  Generalized anxiety disorder        Dose:  50 mg   Take 1 tablet (50 mg) by mouth every 4 hours as needed for anxiety   Quantity:  120 tablet   Refills:  1       * lithium  300 MG CR tablet   Commonly known as:  ESKALITH/LITHOBID   This may have changed:  Another medication with the same name was added. Make sure you understand how and when to take each.   Used for:  Bipolar affective disorder, currently depressed, moderate (H)        Dose:  600 mg   Take 2 tablets (600 mg) by mouth At Bedtime   Quantity:  60 tablet   Refills:  1       * lithium 300 MG CR tablet   Commonly known as:  ESKALITH/LITHOBID   This may have changed:  You were already taking a medication with the same name, and this prescription was added. Make sure you understand how and when to take each.   Used for:  Bipolar affective disorder, currently depressed, moderate (H)        Dose:  300 mg   Take 1 tablet (300 mg) by mouth daily   Quantity:  30 tablet   Refills:  1       * risperiDONE 2 MG tablet   Commonly known as:  risperDAL   This may have changed:  You were already taking a medication with the same name, and this prescription was added. Make sure you understand how and when to take each.   Used for:  Bipolar affective disorder, currently depressed, moderate (H)        Dose:  2 mg   Take 1 tablet (2 mg) by mouth At Bedtime   Quantity:  30 tablet   Refills:  1       * risperiDONE 1 MG tablet   Commonly known as:  risperDAL   This may have changed:    - medication strength  - how much to take  - when to take this   Used for:  Bipolar affective disorder, currently depressed, moderate (H)        Dose:  1 mg   Take 1 tablet (1 mg) by mouth daily   Quantity:  30 tablet   Refills:  1       * Notice:  This list has 4 medication(s) that are the same as other medications prescribed for you. Read the directions carefully, and ask your doctor or other care provider to review them with you.      CONTINUE these medicines which have NOT CHANGED        Dose / Directions    medroxyPROGESTERone 150 MG/ML injection   Commonly known as:  DEPO-PROVERA   Used for:  Other and unspecified ovarian cyst, Unspecified symptom associated  with female genital organs        Dose:  150 mg   Inject 1 mL (150 mg) into the muscle every 3 months   Quantity:  3 mL   Refills:  3         STOP taking     LANsoprazole 15 MG CR capsule   Commonly known as:  PREVACID                Where to get your medicines      These medications were sent to [facility] [place], [place] - [address]  [address] [facility], [facility] [address]     Phone:  [phone]     buPROPion 300 MG 24 hr tablet    busPIRone 15 MG tablet    FLUoxetine 20 MG capsule    hydrOXYzine 50 MG tablet    lithium 300 MG CR tablet    lithium 300 MG CR tablet    risperiDONE 1 MG tablet    risperiDONE 2 MG tablet    traZODone 50 MG tablet                Protect others around you: Learn how to safely use, store and throw away your medicines at www.disposemymeds.org.             Medication List: This is a list of all your medications and when to take them. Check marks below indicate your daily home schedule. Keep this list as a reference.      Medications           Morning Afternoon Evening Bedtime As Needed    buPROPion 300 MG 24 hr tablet   Commonly known as:  WELLBUTRIN XL   Take 1 tablet (300 mg) by mouth daily   Last time this was given:  300 mg on 2/9/2018  8:34 AM                                busPIRone 15 MG tablet   Commonly known as:  BUSPAR   Take 1 tablet (15 mg) by mouth 3 times daily   Last time this was given:  15 mg on 2/9/2018  8:34 AM                                FLUoxetine 20 MG capsule   Commonly known as:  PROzac   Take 1 capsule (20 mg) by mouth daily   Last time this was given:  20 mg on 2/9/2018  8:34 AM                                hydrOXYzine 50 MG tablet   Commonly known as:  ATARAX   Take 1 tablet (50 mg) by mouth every 4 hours as needed for anxiety                                * lithium 300 MG CR tablet   Commonly known as:  ESKALITH/LITHOBID   Take 2 tablets (600 mg) by mouth At Bedtime   Last time this was given:  300 mg on 2/9/2018  8:34 AM                                 * lithium 300 MG CR tablet   Commonly known as:  ESKALITH/LITHOBID   Take 1 tablet (300 mg) by mouth daily   Last time this was given:  300 mg on 2/9/2018  8:34 AM                                medroxyPROGESTERone 150 MG/ML injection   Commonly known as:  DEPO-PROVERA   Inject 1 mL (150 mg) into the muscle every 3 months                                * risperiDONE 2 MG tablet   Commonly known as:  risperDAL   Take 1 tablet (2 mg) by mouth At Bedtime   Last time this was given:  1 mg on 2/9/2018  8:34 AM                                * risperiDONE 1 MG tablet   Commonly known as:  risperDAL   Take 1 tablet (1 mg) by mouth daily   Last time this was given:  1 mg on 2/9/2018  8:34 AM                                traZODone 50 MG tablet   Commonly known as:  DESYREL   Take 1 tablet (50 mg) by mouth nightly as needed for sleep   Last time this was given:  50 mg on 2/8/2018 10:03 PM                                * Notice:  This list has 4 medication(s) that are the same as other medications prescribed for you. Read the directions carefully, and ask your doctor or other care provider to review them with you.

## 2018-02-07 LAB
ALBUMIN SERPL-MCNC: 3.5 G/DL (ref 3.4–5)
ALP SERPL-CCNC: 66 U/L (ref 40–150)
ALT SERPL W P-5'-P-CCNC: 25 U/L (ref 0–50)
ANION GAP SERPL CALCULATED.3IONS-SCNC: 8 MMOL/L (ref 3–14)
AST SERPL W P-5'-P-CCNC: 13 U/L (ref 0–45)
BASOPHILS # BLD AUTO: 0 10E9/L (ref 0–0.2)
BASOPHILS NFR BLD AUTO: 0.2 %
BILIRUB SERPL-MCNC: 0.2 MG/DL (ref 0.2–1.3)
BUN SERPL-MCNC: 13 MG/DL (ref 7–30)
CALCIUM SERPL-MCNC: 8.8 MG/DL (ref 8.5–10.1)
CHLORIDE SERPL-SCNC: 109 MMOL/L (ref 94–109)
CHOLEST SERPL-MCNC: 149 MG/DL
CO2 SERPL-SCNC: 24 MMOL/L (ref 20–32)
CREAT SERPL-MCNC: 0.69 MG/DL (ref 0.52–1.04)
DIFFERENTIAL METHOD BLD: NORMAL
EOSINOPHIL # BLD AUTO: 0.2 10E9/L (ref 0–0.7)
EOSINOPHIL NFR BLD AUTO: 3.6 %
ERYTHROCYTE [DISTWIDTH] IN BLOOD BY AUTOMATED COUNT: 12.2 % (ref 10–15)
GFR SERPL CREATININE-BSD FRML MDRD: >90 ML/MIN/1.7M2
GLUCOSE SERPL-MCNC: 90 MG/DL (ref 70–99)
HCT VFR BLD AUTO: 42.4 % (ref 35–47)
HDLC SERPL-MCNC: 52 MG/DL
HGB BLD-MCNC: 13.8 G/DL (ref 11.7–15.7)
IMM GRANULOCYTES # BLD: 0 10E9/L (ref 0–0.4)
IMM GRANULOCYTES NFR BLD: 0.5 %
LDLC SERPL CALC-MCNC: 83 MG/DL
LYMPHOCYTES # BLD AUTO: 1.3 10E9/L (ref 0.8–5.3)
LYMPHOCYTES NFR BLD AUTO: 30.6 %
MCH RBC QN AUTO: 29.1 PG (ref 26.5–33)
MCHC RBC AUTO-ENTMCNC: 32.5 G/DL (ref 31.5–36.5)
MCV RBC AUTO: 89 FL (ref 78–100)
MONOCYTES # BLD AUTO: 0.5 10E9/L (ref 0–1.3)
MONOCYTES NFR BLD AUTO: 10.9 %
NEUTROPHILS # BLD AUTO: 2.3 10E9/L (ref 1.6–8.3)
NEUTROPHILS NFR BLD AUTO: 54.2 %
NONHDLC SERPL-MCNC: 97 MG/DL
NRBC # BLD AUTO: 0 10*3/UL
NRBC BLD AUTO-RTO: 0 /100
PLATELET # BLD AUTO: 209 10E9/L (ref 150–450)
POTASSIUM SERPL-SCNC: 4 MMOL/L (ref 3.4–5.3)
PROT SERPL-MCNC: 7 G/DL (ref 6.8–8.8)
RBC # BLD AUTO: 4.75 10E12/L (ref 3.8–5.2)
SODIUM SERPL-SCNC: 141 MMOL/L (ref 133–144)
TRIGL SERPL-MCNC: 72 MG/DL
TSH SERPL DL<=0.005 MIU/L-ACNC: 1.6 MU/L (ref 0.4–4)
WBC # BLD AUTO: 4.2 10E9/L (ref 4–11)

## 2018-02-07 PROCEDURE — H2032 ACTIVITY THERAPY, PER 15 MIN: HCPCS

## 2018-02-07 PROCEDURE — 12400007 ZZH R&B MH INTERMEDIATE UMMC

## 2018-02-07 PROCEDURE — 36415 COLL VENOUS BLD VENIPUNCTURE: CPT | Performed by: CLINICAL NURSE SPECIALIST

## 2018-02-07 PROCEDURE — 85025 COMPLETE CBC W/AUTO DIFF WBC: CPT | Performed by: CLINICAL NURSE SPECIALIST

## 2018-02-07 PROCEDURE — 99232 SBSQ HOSP IP/OBS MODERATE 35: CPT | Performed by: CLINICAL NURSE SPECIALIST

## 2018-02-07 PROCEDURE — 25000132 ZZH RX MED GY IP 250 OP 250 PS 637: Performed by: CLINICAL NURSE SPECIALIST

## 2018-02-07 PROCEDURE — 80053 COMPREHEN METABOLIC PANEL: CPT | Performed by: CLINICAL NURSE SPECIALIST

## 2018-02-07 PROCEDURE — 80061 LIPID PANEL: CPT | Performed by: CLINICAL NURSE SPECIALIST

## 2018-02-07 PROCEDURE — 84443 ASSAY THYROID STIM HORMONE: CPT | Performed by: CLINICAL NURSE SPECIALIST

## 2018-02-07 RX ADMIN — NICOTINE 1 PATCH: 21 PATCH, EXTENDED RELEASE TRANSDERMAL at 09:10

## 2018-02-07 RX ADMIN — LITHIUM CARBONATE 600 MG: 300 TABLET, EXTENDED RELEASE ORAL at 20:37

## 2018-02-07 RX ADMIN — BUPROPION HYDROCHLORIDE 300 MG: 300 TABLET, FILM COATED, EXTENDED RELEASE ORAL at 09:08

## 2018-02-07 RX ADMIN — NICOTINE POLACRILEX 2 MG: 2 GUM, CHEWING ORAL at 16:04

## 2018-02-07 RX ADMIN — RISPERIDONE 2 MG: 1 TABLET ORAL at 20:37

## 2018-02-07 RX ADMIN — LITHIUM CARBONATE 300 MG: 300 TABLET, EXTENDED RELEASE ORAL at 09:12

## 2018-02-07 RX ADMIN — BUSPIRONE HYDROCHLORIDE 10 MG: 5 TABLET ORAL at 14:59

## 2018-02-07 RX ADMIN — NICOTINE POLACRILEX 2 MG: 2 GUM, CHEWING ORAL at 14:59

## 2018-02-07 RX ADMIN — RISPERIDONE 1 MG: 1 TABLET ORAL at 09:10

## 2018-02-07 RX ADMIN — NICOTINE POLACRILEX 2 MG: 2 GUM, CHEWING ORAL at 09:10

## 2018-02-07 RX ADMIN — NICOTINE POLACRILEX 2 MG: 2 GUM, CHEWING ORAL at 20:37

## 2018-02-07 RX ADMIN — BUSPIRONE HYDROCHLORIDE 10 MG: 5 TABLET ORAL at 20:37

## 2018-02-07 RX ADMIN — TRAZODONE HYDROCHLORIDE 50 MG: 50 TABLET ORAL at 22:21

## 2018-02-07 RX ADMIN — BUSPIRONE HYDROCHLORIDE 10 MG: 5 TABLET ORAL at 09:08

## 2018-02-07 RX ADMIN — FLUOXETINE 40 MG: 20 CAPSULE ORAL at 09:08

## 2018-02-07 ASSESSMENT — ACTIVITIES OF DAILY LIVING (ADL)
GROOMING: INDEPENDENT
GROOMING: INDEPENDENT
DRESS: INDEPENDENT
LAUNDRY: WITH SUPERVISION
DRESS: INDEPENDENT
ORAL_HYGIENE: INDEPENDENT
ORAL_HYGIENE: INDEPENDENT

## 2018-02-07 NOTE — PROGRESS NOTES
Pt denies SI/SIB as well as any hallucinations. Pt cooperative with redirection. Pt had a goal of staying out of their room this shift, which was not met. Pt was out of room before lunch quite a bit, but after lunch, was in room. Pt states they want to sleep, but are often seen staring off into space when checked on. When writer entered to do a check in, pt's eyes were open and darting around. Pt had appropriate eye contact during conversation. Pt stated they wanted to sleep and when writer asked if pt was sleeping when writer walked in, pt was unsure.      02/07/18 1400   Behavioral Health   Hallucinations denies / not responding to hallucinations   Thinking distractable;poor concentration   Orientation person: oriented;place: oriented;date: oriented;time: oriented   Memory baseline memory   Insight poor   Judgement impaired   Eye Contact at examiner   Affect blunted, flat   Mood mood is calm   Physical Appearance/Attire attire appropriate to age and situation   Hygiene well groomed   Suicidality other (see comments)  (denies)   1. Wish to be Dead No   2. Non-Specific Active Suicidal Thoughts  No   Self Injury other (see comment)  (denies)   Elopement (no concerns)   Activity withdrawn;isolative   Speech coherent;clear   Medication Sensitivity no stated side effects;no observed side effects   Psychomotor / Gait balanced;steady   Safety   Suicidality Status 15;Unpredictable frequency of checking on patient   Psycho Education   Type of Intervention 1:1 intervention   Response participates, initiates socially appropriate   Hours 0.5   Treatment Detail Check in   Activities of Daily Living   Hygiene/Grooming independent   Oral Hygiene independent   Dress independent   Room Organization independent   Activity   Activity Assistance Provided independent   Behavioral Health Interventions   Behavioral Disturbance maintain safety precautions   Social and Therapeutic Interventions (Behavioral Disturbance) encourage  socialization with peers;encourage effective boundaries with peers;encourage participation in therapeutic groups and milieu activities

## 2018-02-07 NOTE — PROGRESS NOTES
Spent most of her evening resting and napping in her room. She reports that she feels tired, denies SI, SIB, hallucinations, and rated her mood at three out of ten. Pt graded her depression at five, and anxiety at eight out of ten. She  stated that she has already set-up outpatient  therapy, and will start after her discharge from the hospital. Overall, this patient appears calm, pleasant, isolates in her room, displays flat affect, and accepts redirections.       02/06/18 2057   Behavioral Health   Hallucinations denies / not responding to hallucinations   Thinking distractable   Orientation person: oriented;place: oriented   Memory baseline memory   Insight poor   Judgement impaired   Eye Contact at examiner   Affect blunted, flat   Mood mood is calm   Physical Appearance/Attire appears stated age;attire appropriate to age and situation   Hygiene well groomed   Suicidality other (see comments)  (Pt denies)   1. Wish to be Dead No   2. Non-Specific Active Suicidal Thoughts  No   Self Injury other (see comment)  (Pt denies)   Elopement (No concern reported or observed)   Activity isolative   Speech clear;coherent   Psychomotor / Gait balanced;steady   Sleep/Rest/Relaxation   Day/Evening Time Hours napping;resting in bed   Number of hours napping 1.5 hours   Number of hours resting in bed 3 hours   Coping/Psychosocial   Verbalized Emotional State acceptance;depression;anxiety   Activities of Daily Living   Hygiene/Grooming independent   Oral Hygiene independent   Dress independent   Room Organization independent   Activity   Activity Assistance Provided independent   Behavioral Health Interventions   Behavioral Disturbance maintain safety precautions;maintain safe secure environment;decrease environmental stimulation;encourage clear communication of needs;encourage nutrition and hydration;encourage participation / independence with adls;provide emotional support;establish therapeutic relationship;provide positive  feedback for use of effective coping skills;build upon strengths   Social and Therapeutic Interventions (Behavioral Disturbance) encourage socialization with peers;encourage effective boundaries with peers;encourage participation in therapeutic groups and milieu activities

## 2018-02-07 NOTE — PROGRESS NOTES
"[facility] [place]   Psychiatric Progress Note        Interim History:   The patient's care was discussed with the treatment team during the daily team meeting and/or staff's chart notes were reviewed.  Staff report patient has been visible in the milieu but spent the afternoon in her room.     Patient reports she feels calmer today but is\" irritable with the people on the unit.\" \"They think the world revolves around them.\"     Discussed decreasing Prozac due to her irritability. Risperdal was increase from 2 mg daily to 3 mg daily for mood stabilization. Patient feels her medications are \"good\" but she wants to take less medication.     Proposed discharge date is Friday 2/9       Medications:       [START ON 2/8/2018] FLUoxetine  20 mg Oral Daily     nicotine   Transdermal Q8H     nicotine   Transdermal Daily     nicotine  1 patch Transdermal Daily     lithium  600 mg Oral At Bedtime     lithium  300 mg Oral Daily     risperiDONE  1 mg Oral Daily     risperiDONE  2 mg Oral At Bedtime     busPIRone  10 mg Oral TID     buPROPion  300 mg Oral Daily          Allergies:   No Known Allergies       Labs:     Recent Results (from the past 24 hour(s))   CBC with platelets differential    Collection Time: 02/07/18  7:50 AM   Result Value Ref Range    WBC 4.2 4.0 - 11.0 10e9/L    RBC Count 4.75 3.8 - 5.2 10e12/L    Hemoglobin 13.8 11.7 - 15.7 g/dL    Hematocrit 42.4 35.0 - 47.0 %    MCV 89 78 - 100 fl    MCH 29.1 26.5 - 33.0 pg    MCHC 32.5 31.5 - 36.5 g/dL    RDW 12.2 10.0 - 15.0 %    Platelet Count 209 150 - 450 10e9/L    Diff Method Automated Method     % Neutrophils 54.2 %    % Lymphocytes 30.6 %    % Monocytes 10.9 %    % Eosinophils 3.6 %    % Basophils 0.2 %    % Immature Granulocytes 0.5 %    Nucleated RBCs 0 0 /100    Absolute Neutrophil 2.3 1.6 - 8.3 10e9/L    Absolute Lymphocytes 1.3 0.8 - 5.3 10e9/L    Absolute Monocytes 0.5 0.0 - 1.3 10e9/L    Absolute Eosinophils 0.2 0.0 - 0.7 " 10e9/L    Absolute Basophils 0.0 0.0 - 0.2 10e9/L    Abs Immature Granulocytes 0.0 0 - 0.4 10e9/L    Absolute Nucleated RBC 0.0    Lipid panel    Collection Time: 02/07/18  7:50 AM   Result Value Ref Range    Cholesterol 149 <200 mg/dL    Triglycerides 72 <150 mg/dL    HDL Cholesterol 52 >49 mg/dL    LDL Cholesterol Calculated 83 <100 mg/dL    Non HDL Cholesterol 97 <130 mg/dL   TSH with free T4 reflex and/or T3 as indicated    Collection Time: 02/07/18  7:50 AM   Result Value Ref Range    TSH 1.60 0.40 - 4.00 mU/L   Comprehensive metabolic panel    Collection Time: 02/07/18  7:50 AM   Result Value Ref Range    Sodium 141 133 - 144 mmol/L    Potassium 4.0 3.4 - 5.3 mmol/L    Chloride 109 94 - 109 mmol/L    Carbon Dioxide 24 20 - 32 mmol/L    Anion Gap 8 3 - 14 mmol/L    Glucose 90 70 - 99 mg/dL    Urea Nitrogen 13 7 - 30 mg/dL    Creatinine 0.69 0.52 - 1.04 mg/dL    GFR Estimate >90 >60 mL/min/1.7m2    GFR Estimate If Black >90 >60 mL/min/1.7m2    Calcium 8.8 8.5 - 10.1 mg/dL    Bilirubin Total 0.2 0.2 - 1.3 mg/dL    Albumin 3.5 3.4 - 5.0 g/dL    Protein Total 7.0 6.8 - 8.8 g/dL    Alkaline Phosphatase 66 40 - 150 U/L    ALT 25 0 - 50 U/L    AST 13 0 - 45 U/L          Psychiatric Examination:     Temp 98.4  F (36.9  C)  Weight is 0 lbs 0 oz  There is no height or weight on file to calculate BMI.  Orthostatic Vitals       Most Recent      Sitting Orthostatic /68 02/07 0850    Sitting Orthostatic Pulse (bpm) 99 02/07 0850    Standing Orthostatic /60 02/07 0850    Standing Orthostatic Pulse (bpm) 129 02/07 0850            Appearance: awake, alert, adequately groomed and dressed in hospital scrubs  Attitude:  cooperative  Eye Contact:  good  Mood:  better  Affect:  intensity is blunted  Speech:  clear, coherent  Psychomotor Behavior:  no evidence of tardive dyskinesia, dystonia, or tics  Throught Process:  linear  Associations:  no loose associations  Thought Content:  no evidence of suicidal ideation or  homicidal ideation  Insight:  fair  Judgement:  fair  Oriented to:  time, person, and place  Attention Span and Concentration:  intact  Recent and Remote Memory:  intact         Precautions:     Behavioral Orders   Procedures     Code 1 - Restrict to Unit     Routine Programming     As clinically indicated     Status 15     Every 15 minutes.     Suicide precautions          DIagnoses:   1.  Bipolar disorder.   2.  Borderline personality disorder.   3.  Cannabis use disorder, moderate, in remission.   4.  Amphetamine use disorder, moderate, in remission.   5.  Benzodiazepine use disorder, moderate, in remission.   6.  History of oppositional defiant disorder.   7.  History of eating disorder in remission.   8.  History of attention deficit hyperactivity disorder.              Plan:     Legal: Patient signed in voluntary    Medication management: Decreased Prozac from 40 mg to 20 mg. Patient is complaining of irritability which having 2 activating antidepressants may exacerbate. Lithium level as of 2/5 0.4.    Dispo: Patient will return to [facility] (sober living) and treatment at [facility].

## 2018-02-07 NOTE — PROGRESS NOTES
02/07/18 1500   Art Therapy   Type of Intervention structured groups   Response participates with encouragement   Hours 2   Pt attended moring groups. They were makiing a box and were unhappy with the results and said they were frustrated and leaving. Writer worked with them on different ideas to make the box as they like and focus more on the layering process than the outcome. They stayed after all and then showed writer the results of the recycled box , they peeled off the layers and wrote their name in sharpie graffiti letters. They wanted to show writer that they were pleased with the final project.  They were appropriately social in groups and helped with music selections.

## 2018-02-08 PROCEDURE — 99231 SBSQ HOSP IP/OBS SF/LOW 25: CPT | Performed by: CLINICAL NURSE SPECIALIST

## 2018-02-08 PROCEDURE — 97150 GROUP THERAPEUTIC PROCEDURES: CPT | Mod: GO

## 2018-02-08 PROCEDURE — 12400007 ZZH R&B MH INTERMEDIATE UMMC

## 2018-02-08 PROCEDURE — 25000132 ZZH RX MED GY IP 250 OP 250 PS 637: Performed by: CLINICAL NURSE SPECIALIST

## 2018-02-08 RX ORDER — HYDROXYZINE HYDROCHLORIDE 50 MG/1
50 TABLET, FILM COATED ORAL EVERY 4 HOURS PRN
Qty: 120 TABLET | Refills: 1 | Status: SHIPPED | OUTPATIENT
Start: 2018-02-08 | End: 2019-03-25

## 2018-02-08 RX ORDER — BUSPIRONE HYDROCHLORIDE 15 MG/1
15 TABLET ORAL 3 TIMES DAILY
Status: DISCONTINUED | OUTPATIENT
Start: 2018-02-08 | End: 2018-02-09 | Stop reason: HOSPADM

## 2018-02-08 RX ORDER — BUSPIRONE HYDROCHLORIDE 15 MG/1
15 TABLET ORAL 3 TIMES DAILY
Qty: 90 TABLET | Refills: 1 | Status: SHIPPED | OUTPATIENT
Start: 2018-02-08 | End: 2019-03-25

## 2018-02-08 RX ORDER — BUPROPION HYDROCHLORIDE 300 MG/1
300 TABLET ORAL DAILY
Qty: 30 TABLET | Refills: 1 | Status: SHIPPED | OUTPATIENT
Start: 2018-02-09 | End: 2019-03-25

## 2018-02-08 RX ORDER — HYDROXYZINE HYDROCHLORIDE 50 MG/1
50 TABLET, FILM COATED ORAL EVERY 4 HOURS PRN
Status: DISCONTINUED | OUTPATIENT
Start: 2018-02-08 | End: 2018-02-09 | Stop reason: HOSPADM

## 2018-02-08 RX ORDER — TRAZODONE HYDROCHLORIDE 50 MG/1
50 TABLET, FILM COATED ORAL
Qty: 30 TABLET | Refills: 1 | Status: SHIPPED | OUTPATIENT
Start: 2018-02-08 | End: 2019-10-04

## 2018-02-08 RX ORDER — RISPERIDONE 2 MG/1
2 TABLET ORAL AT BEDTIME
Qty: 30 TABLET | Refills: 1 | Status: SHIPPED | OUTPATIENT
Start: 2018-02-08 | End: 2019-10-04

## 2018-02-08 RX ORDER — LITHIUM CARBONATE 300 MG/1
300 TABLET, FILM COATED, EXTENDED RELEASE ORAL DAILY
Qty: 30 TABLET | Refills: 1 | Status: SHIPPED | OUTPATIENT
Start: 2018-02-09 | End: 2019-10-04

## 2018-02-08 RX ORDER — RISPERIDONE 1 MG/1
1 TABLET ORAL DAILY
Qty: 30 TABLET | Refills: 1 | Status: SHIPPED | OUTPATIENT
Start: 2018-02-09 | End: 2019-10-04

## 2018-02-08 RX ORDER — LITHIUM CARBONATE 300 MG/1
600 TABLET, FILM COATED, EXTENDED RELEASE ORAL AT BEDTIME
Qty: 60 TABLET | Refills: 1 | Status: SHIPPED | OUTPATIENT
Start: 2018-02-08 | End: 2019-10-04

## 2018-02-08 RX ADMIN — TRAZODONE HYDROCHLORIDE 50 MG: 50 TABLET ORAL at 22:03

## 2018-02-08 RX ADMIN — BUSPIRONE HYDROCHLORIDE 15 MG: 15 TABLET ORAL at 14:10

## 2018-02-08 RX ADMIN — NICOTINE POLACRILEX 2 MG: 2 GUM, CHEWING ORAL at 20:45

## 2018-02-08 RX ADMIN — NICOTINE POLACRILEX 2 MG: 2 GUM, CHEWING ORAL at 16:05

## 2018-02-08 RX ADMIN — LITHIUM CARBONATE 300 MG: 300 TABLET, EXTENDED RELEASE ORAL at 08:32

## 2018-02-08 RX ADMIN — BUSPIRONE HYDROCHLORIDE 10 MG: 5 TABLET ORAL at 08:32

## 2018-02-08 RX ADMIN — NICOTINE 1 PATCH: 21 PATCH, EXTENDED RELEASE TRANSDERMAL at 08:32

## 2018-02-08 RX ADMIN — BUPROPION HYDROCHLORIDE 300 MG: 300 TABLET, FILM COATED, EXTENDED RELEASE ORAL at 08:32

## 2018-02-08 RX ADMIN — RISPERIDONE 1 MG: 1 TABLET ORAL at 08:32

## 2018-02-08 RX ADMIN — NICOTINE POLACRILEX 2 MG: 2 GUM, CHEWING ORAL at 14:11

## 2018-02-08 RX ADMIN — BUSPIRONE HYDROCHLORIDE 15 MG: 15 TABLET ORAL at 20:45

## 2018-02-08 RX ADMIN — FLUOXETINE 20 MG: 20 CAPSULE ORAL at 08:32

## 2018-02-08 RX ADMIN — LITHIUM CARBONATE 600 MG: 300 TABLET, EXTENDED RELEASE ORAL at 20:45

## 2018-02-08 RX ADMIN — NICOTINE POLACRILEX 2 MG: 2 GUM, CHEWING ORAL at 15:00

## 2018-02-08 RX ADMIN — RISPERIDONE 2 MG: 1 TABLET ORAL at 20:45

## 2018-02-08 ASSESSMENT — ACTIVITIES OF DAILY LIVING (ADL)
DRESS: INDEPENDENT
ORAL_HYGIENE: INDEPENDENT
GROOMING: INDEPENDENT

## 2018-02-08 NOTE — PROGRESS NOTES
[facility], [place]   Psychiatric Progress Note        Interim History:   The patient's care was discussed with the treatment team during the daily team meeting and/or staff's chart notes were reviewed.  Staff report patient has been visible in the milieu but spent the afternoon in her room.     Patient reports she is feeling calmer since she has started taking her medications again. Patient presents with a stable mood but reports some irritability due to a peer. Patient continues to struggle with interpersonal relationships. She reports that her sobriety is important to her. She feels she is able to do more with life since she has been sober. Patient has been compliant with her medications. She is engaged in her treatment.     Discussed decreasing Prozac and increasing Risperdal. Patient denies any side effects form her medication adjustments.          Medications:       busPIRone  15 mg Oral TID     FLUoxetine  20 mg Oral Daily     nicotine   Transdermal Q8H     nicotine   Transdermal Daily     nicotine  1 patch Transdermal Daily     lithium  600 mg Oral At Bedtime     lithium  300 mg Oral Daily     risperiDONE  1 mg Oral Daily     risperiDONE  2 mg Oral At Bedtime     buPROPion  300 mg Oral Daily          Allergies:   No Known Allergies       Labs:   No results found for this or any previous visit (from the past 24 hour(s)).       Psychiatric Examination:     /75  Pulse 101  Temp 97.2  F (36.2  C) (Oral)  Resp 16  Wt 68.6 kg (151 lb 3.2 oz)  BMI 23.68 kg/m2  Weight is 151 lbs 3.2 oz  Body mass index is 23.68 kg/(m^2).  Orthostatic Vitals       Most Recent      Sitting Orthostatic /75 02/08 0900    Sitting Orthostatic Pulse (bpm) 101 02/08 0900    Standing Orthostatic /60 02/07 0850    Standing Orthostatic Pulse (bpm) 129 02/07 0850         Appearance: awake, alert, adequately groomed and dressed in hospital scrubs  Attitude:  cooperative  Eye Contact:   good  Mood:  better  Affect:  intensity is blunted  Speech:  clear, coherent  Psychomotor Behavior:  no evidence of tardive dyskinesia, dystonia, or tics  Throught Process:  linear  Associations:  no loose associations  Thought Content:  no evidence of suicidal ideation or homicidal ideation  Insight:  fair  Judgement:  fair  Oriented to:  time, person, and place  Attention Span and Concentration:  intact  Recent and Remote Memory:  intact           Precautions:     Behavioral Orders   Procedures     Code 1 - Restrict to Unit     Routine Programming     As clinically indicated     Status 15     Every 15 minutes.          DIagnoses:     1.  Bipolar disorder.   2.  Borderline personality disorder.   3.  Cannabis use disorder, moderate, in remission.   4.  Amphetamine use disorder, moderate, in remission.   5.  Benzodiazepine use disorder, moderate, in remission.   6.  History of oppositional defiant disorder.   7.  History of eating disorder in remission.   8.  History of attention deficit hyperactivity disorder.            Plan:     Legal: Patient signed in voluntary     Medication management: Decreased Prozac from 40 mg to 20 mg. Patient is complaining of irritability which having 2 activating antidepressants may exacerbate. Lithium level as of 2/5 0.4.     Dispo: Patient will return to [facility] (sober living) and treatment at [facility]. Proposed discharge is Friday 2/8. Mother will .

## 2018-02-08 NOTE — PROGRESS NOTES
Patient stated that she feels good, and apprehensive because she working on making sure she complies with her med hygiene. She reported that she will secure something to help her  organize her medications according to time and day as prescribed by her provider. She denies SI, SIB,depression, and rates her mood at seven out of ten. Pt stated that she feels excited about her discharge on Friday morning. She appears calm, pleasant, isolates in her room, socially withdrawn, and follows directions.       02/07/18 1843   Behavioral Health   Hallucinations denies / not responding to hallucinations   Thinking poor concentration   Orientation person: oriented;place: oriented   Memory baseline memory   Insight admits / accepts   Judgement (Good during the shift)   Eye Contact at examiner   Affect blunted, flat   Mood mood is calm   Physical Appearance/Attire appears stated age;attire appropriate to age and situation   Hygiene well groomed   Suicidality other (see comments)  (Pt denies)   1. Wish to be Dead No   2. Non-Specific Active Suicidal Thoughts  No   Self Injury other (see comment)  (Denies)   Elopement (No conern reported or observed)   Activity isolative   Speech clear;coherent   Psychomotor / Gait balanced;steady   Sleep/Rest/Relaxation   Day/Evening Time Hours up all shift   Coping/Psychosocial   Verbalized Emotional State acceptance   Activities of Daily Living   Hygiene/Grooming independent   Oral Hygiene independent   Dress independent   Laundry with supervision   Room Organization independent   Activity   Activity Assistance Provided independent   Behavioral Health Interventions   Behavioral Disturbance maintain safety precautions;maintain safe secure environment;encourage clear communication of needs;provide emotional support;establish therapeutic relationship;provide positive feedback for use of effective coping skills;build upon strengths   Social and Therapeutic Interventions (Behavioral Disturbance)  encourage socialization with peers;encourage effective boundaries with peers;encourage participation in therapeutic groups and milieu activities

## 2018-02-08 NOTE — PLAN OF CARE
Problem: Behavioral Disturbance  Goal: Behavioral Disturbance  Signs and symptoms of listed problems will be absent or manageable by discharge or transition of care.       48 hour: Pt spends most of her time in her room. She states that a peer pt irritates her. Pt denies SI and is pleasant and redirectable but isolates most of her shifts. Will encourage participation in programming.

## 2018-02-08 NOTE — PROGRESS NOTES
Attendence: Pt. Attended scheduled 1 of 2 OT sessions today.   Observations: pt initial assessment initiated but not complete due to pt only coming to one OT group and not verbalizing or engaging, and returning to room and not participating after group. Pt was quiet and remained engaged in self-chosen tasks including coloring and reading during OT clinic.     02/08/18 1500   Occupational Therapy   Type of Intervention structured groups   Response Participates   Hours 2

## 2018-02-09 VITALS
RESPIRATION RATE: 16 BRPM | HEART RATE: 90 BPM | DIASTOLIC BLOOD PRESSURE: 59 MMHG | WEIGHT: 151.2 LBS | TEMPERATURE: 95.6 F | SYSTOLIC BLOOD PRESSURE: 118 MMHG | BODY MASS INDEX: 23.68 KG/M2

## 2018-02-09 PROCEDURE — 90853 GROUP PSYCHOTHERAPY: CPT

## 2018-02-09 PROCEDURE — 99239 HOSP IP/OBS DSCHRG MGMT >30: CPT | Performed by: CLINICAL NURSE SPECIALIST

## 2018-02-09 PROCEDURE — 97150 GROUP THERAPEUTIC PROCEDURES: CPT | Mod: GO

## 2018-02-09 PROCEDURE — 25000132 ZZH RX MED GY IP 250 OP 250 PS 637: Performed by: CLINICAL NURSE SPECIALIST

## 2018-02-09 RX ADMIN — BUSPIRONE HYDROCHLORIDE 15 MG: 15 TABLET ORAL at 08:34

## 2018-02-09 RX ADMIN — NICOTINE 1 PATCH: 21 PATCH, EXTENDED RELEASE TRANSDERMAL at 08:34

## 2018-02-09 RX ADMIN — NICOTINE POLACRILEX 2 MG: 2 GUM, CHEWING ORAL at 12:23

## 2018-02-09 RX ADMIN — BUPROPION HYDROCHLORIDE 300 MG: 300 TABLET, FILM COATED, EXTENDED RELEASE ORAL at 08:34

## 2018-02-09 RX ADMIN — FLUOXETINE 20 MG: 20 CAPSULE ORAL at 08:34

## 2018-02-09 RX ADMIN — NICOTINE POLACRILEX 2 MG: 2 GUM, CHEWING ORAL at 11:06

## 2018-02-09 RX ADMIN — RISPERIDONE 1 MG: 1 TABLET ORAL at 08:34

## 2018-02-09 RX ADMIN — LITHIUM CARBONATE 300 MG: 300 TABLET, EXTENDED RELEASE ORAL at 08:34

## 2018-02-09 NOTE — PROGRESS NOTES
DISCHARGE: Pt was DC to mom. Pt denies SI and states her anxiety is doing fairly well. This RN has reviewed all meds and aftercare plan with the pt. All belongings returned.  Pt pleasant, bright and smiling upon DC.

## 2018-02-09 NOTE — DISCHARGE SUMMARY
"Psychiatric Discharge Summary    Raissa Ruth MRN# [id number]   Age: 22 year old YOB: 1995     Date of Admission:  2/6/2018  Date of Discharge:  2/9/2018  Admitting Physician:  Shamar Henderson MD  Discharge Physician:  Debra A. Naegele, APRN CNS (Contact: [phone])         Event Leading to Hospitalization:   Raissa Ruth is a 22-year-old single  female presenting with suicidal ideation and increased irritability due to not taking her medications.  The patient reports she has had decreased motivation and has not been motivated to go see Dr. Akers at [facility] to refill her prescriptions.  The patient reports she has been taking her medications sporadically over the past month or so.  Due to not taking her medications as prescribed, she is experiencing more irritability and racing thoughts.  The patient is reporting that people overwhelm her.  The patient has been living at [facility] in [facility].  The patient reports she feels that it has been going well for her and has been doing the most she has ever done at one time in her life.  The patient reports she has had some \"slip ups.\"  The patient reports about 4-5 months ago she was abusing gabapentin.  The patient reports she was overusing it and trying to get high.  Patient reports she also \"smoked weed 3 months ago.\"  The patient reports [facility] but reports [facility] has [facility] to lack of motivation.  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  The patient reports she is irritable and angry all the time.  The patient reports that she is seeking out something to help her with her irritability and \"wants to take as many meds as possible\" to help her with her irritability.            See Admission note by Debra Naegele APRN, CNS on 2/6/2018 for additional details.      "     DIagnoses:     1.  Bipolar disorder.   2.  Borderline personality disorder.   3.  Cannabis use disorder, moderate, in remission.   4.  Amphetamine use disorder, moderate, in remission.   5.  Benzodiazepine use disorder, moderate, in remission.   6.  History of oppositional defiant disorder.   7.  History of eating disorder in remission.   8.  History of attention deficit hyperactivity disorder.             Labs:     Results for orders placed or performed during the hospital encounter of 02/06/18   CBC with platelets differential   Result Value Ref Range    WBC 4.2 4.0 - 11.0 10e9/L    RBC Count 4.75 3.8 - 5.2 10e12/L    Hemoglobin 13.8 11.7 - 15.7 g/dL    Hematocrit 42.4 35.0 - 47.0 %    MCV 89 78 - 100 fl    MCH 29.1 26.5 - 33.0 pg    MCHC 32.5 31.5 - 36.5 g/dL    RDW 12.2 10.0 - 15.0 %    Platelet Count 209 150 - 450 10e9/L    Diff Method Automated Method     % Neutrophils 54.2 %    % Lymphocytes 30.6 %    % Monocytes 10.9 %    % Eosinophils 3.6 %    % Basophils 0.2 %    % Immature Granulocytes 0.5 %    Nucleated RBCs 0 0 /100    Absolute Neutrophil 2.3 1.6 - 8.3 10e9/L    Absolute Lymphocytes 1.3 0.8 - 5.3 10e9/L    Absolute Monocytes 0.5 0.0 - 1.3 10e9/L    Absolute Eosinophils 0.2 0.0 - 0.7 10e9/L    Absolute Basophils 0.0 0.0 - 0.2 10e9/L    Abs Immature Granulocytes 0.0 0 - 0.4 10e9/L    Absolute Nucleated RBC 0.0    Lipid panel   Result Value Ref Range    Cholesterol 149 <200 mg/dL    Triglycerides 72 <150 mg/dL    HDL Cholesterol 52 >49 mg/dL    LDL Cholesterol Calculated 83 <100 mg/dL    Non HDL Cholesterol 97 <130 mg/dL   TSH with free T4 reflex and/or T3 as indicated   Result Value Ref Range    TSH 1.60 0.40 - 4.00 mU/L   Comprehensive metabolic panel   Result Value Ref Range    Sodium 141 133 - 144 mmol/L    Potassium 4.0 3.4 - 5.3 mmol/L    Chloride 109 94 - 109 mmol/L    Carbon Dioxide 24 20 - 32 mmol/L    Anion Gap 8 3 - 14 mmol/L    Glucose 90 70 - 99 mg/dL    Urea Nitrogen 13 7 - 30 mg/dL     Creatinine 0.69 0.52 - 1.04 mg/dL    GFR Estimate >90 >60 mL/min/1.7m2    GFR Estimate If Black >90 >60 mL/min/1.7m2    Calcium 8.8 8.5 - 10.1 mg/dL    Bilirubin Total 0.2 0.2 - 1.3 mg/dL    Albumin 3.5 3.4 - 5.0 g/dL    Protein Total 7.0 6.8 - 8.8 g/dL    Alkaline Phosphatase 66 40 - 150 U/L    ALT 25 0 - 50 U/L    AST 13 0 - 45 U/L   Internal Medicine Adult IP Consult for [facility] Adult on 4A: Patient to be seen: Routine within 24 hrs; Call back #: [phone]; H&P outside hospital; Consultant may enter orders: Yes    Narrative    Luly Drake PA-C     2/6/2018  5:14 PM    Internal Medicine Initial Visit    Raissa Ruth MRN# [id number]   Age: 22 year old YOB: 1995   Date of Admission: 2/6/2018     Reason for consult: General Medical Evaluation       Requesting physician Debra Naegele, APRN CNP      SUBJECTIVE   HPI:   Raissa Ruth is a 22 year old female with history of bipolar   disorder, anxiety, depression, and alcohol abuse admitted to   station 4A for suicidal ideation. Medicine was consulted to   perform a general medical evaluation.    Patient presented to [facility] ED with suicidal ideation with plans to   cut herself. Also voiced desire to relapse on alcohol. Patient   has not had any of her medications for ~2 weeks due to not   obtaining refills. Denies any recent alcohol or illicit substance   use.    Currently, patient is feeling okay. Endorses intermittent   headaches which are relieved with tylenol. Notes she is no longer   taking depo-provera injections. Reports eating and drinking well.   Otherwise no physical complaints, denies fevers, chills,   dizziness, chest pain, SOB, abdominal pain, nausea, vomiting,   dysuria, diarrhea, constipation, or peripheral edema.   Past Medical History:     Past Medical History:   Diagnosis Date     Anxiety      Bipolar 1 disorder (H)      Depression      Substance abuse       Reviewed & updated in [facility].     Past Surgical History:       Past Surgical History:   Procedure Laterality Date     APPENDECTOMY  2011     HC TOOTH EXTRACTION W/FORCEP        Reviewed & updated in Epic.     Medications prior to admission:     Prior to Admission Medications   Prescriptions Last Dose Informant Patient Reported? Taking?   LANsoprazole (PREVACID) 15 MG capsule   Yes No   hydrOXYzine (ATARAX) 25 MG tablet   No No   Sig: Take 1-2 tablets (25-50 mg) by mouth every 4 hours as needed   for anxiety   lithium (ESKALITH/LITHOBID) 300 MG CR tablet   No No   Sig: Take 2 tablets (600 mg) by mouth At Bedtime   medroxyPROGESTERone (DEPO-PROVERA) 150 MG/ML injection   No No   Sig: Inject 1 mL (150 mg) into the muscle every 3 months   risperiDONE (RISPERDAL) 0.5 MG tablet   No No   Sig: Take 1 tablet (0.5 mg) by mouth 2 times daily      Facility-Administered Medications: None         Allergies:   No Known Allergies      Social History:   Tobacco Use: Reports smoking 1/2 ppd  Alcohol Use: Declines  Illicit Drug Use: Declines  STI Testing: Declines at this time     Family History:     Family History   Problem Relation Age of Onset     Breast Cancer Maternal Grandmother      Breast Cancer Paternal Grandmother      Depression Mother       Reviewed & updated in Epic.     Review of Systems:   Ten point review of systems negative except as stated above in   History of Present Illness.    OBJECTIVE   Physical Exam:   There were no vitals taken for this visit.  General: Well-appearing  female resting comfortably in   bed, NAD.  HEENT: NC/AT. Anicteric sclera. Mucous membranes moist.  Neck: Supple  Cardiovascular: RRR. S1/S2. No murmurs appreciated.  Lungs: Normal respiratory effort on RA. Lungs CTAB without   wheezes, rales, or rhonchi.  Abdomen: Soft, non-tender, and nondistended with normoactive   bowel sounds.  Extremities: Warm & well perfused. No peripheral edema.  Skin: No rashes, jaundice, or lesions on exposed areas of skin.  Neurologic: A&O X 3. Answers questions  appropriately. Moves all   extremities symmetrically.     Laboratory Data:   No pertinent laboratory data obtained this admission.      Assessment and Plan/Recommendations:   Raissa Ruth is a 22 year old female with history of bipolar   disorder, anxiety, depression, and alcohol abuse admitted to   station 4A for suicidal ideation.    Bipolar Disorder, Depression, Anxiety, Suicidal Ideation.   Presented to OSH ED with plans to cut herself or relapse on   alcohol. U tox in ED negative. Admission labs ordered for AM per   psychiatry.  - Management per Psychiatry    Medicine will sign off at this time. Please page the Internal   Medicine job code pager for any intercurrent medical issues which   arise. Thank you for the opportunity to be a part of this   patient's care.    Luly Manley PA-C  Hospitalist Service  [phone]                    Consults:   Consultation during this admission received from internal medicine on 2/6/2018       Raissa Ruth MRN# [id number]   Age: 22 year old YOB: 1995   Date of Admission:                   2/6/2018      Reason for consult: General Medical Evaluation         Requesting physician Debra Naegele, APRN CNP      SUBJECTIVE   HPI:   Raissa Ruth is a 22 year old female with history of bipolar disorder, anxiety, depression, and alcohol abuse admitted to station 4A for suicidal ideation. Medicine was consulted to perform a general medical evaluation.     Patient presented to OSH ED with suicidal ideation with plans to cut herself. Also voiced desire to relapse on alcohol. Patient has not had any of her medications for ~2 weeks due to not obtaining refills. Denies any recent alcohol or illicit substance use.     Currently, patient is feeling okay. Endorses intermittent headaches which are relieved with tylenol. Notes she is no longer taking depo-provera injections. Reports eating and drinking well. Otherwise no physical complaints, denies fevers, chills,  dizziness, chest pain, SOB, abdominal pain, nausea, vomiting, dysuria, diarrhea, constipation, or peripheral edema.   Past Medical History:       Past Medical History         Past Medical History:   Diagnosis Date     Anxiety       Bipolar 1 disorder (H)       Depression       Substance abuse           Reviewed & updated in [employer].      Past Surgical History:        Past Surgical History          Past Surgical History:   Procedure Laterality Date     APPENDECTOMY   2011     HC TOOTH EXTRACTION W/FORCEP             Reviewed & updated in Epic.      Medications prior to admission:      Prior to Admission Medications   Prescriptions Last Dose Informant Patient Reported? Taking?   LANsoprazole (PREVACID) 15 MG capsule     Yes No   hydrOXYzine (ATARAX) 25 MG tablet     No No   Sig: Take 1-2 tablets (25-50 mg) by mouth every 4 hours as needed for anxiety   lithium (ESKALITH/LITHOBID) 300 MG CR tablet     No No   Sig: Take 2 tablets (600 mg) by mouth At Bedtime   medroxyPROGESTERone (DEPO-PROVERA) 150 MG/ML injection     No No   Sig: Inject 1 mL (150 mg) into the muscle every 3 months   risperiDONE (RISPERDAL) 0.5 MG tablet     No No   Sig: Take 1 tablet (0.5 mg) by mouth 2 times daily       Facility-Administered Medications: None          Allergies:   No Known Allergies       Social History:   Tobacco Use: Reports smoking 1/2 ppd  Alcohol Use: Declines  Illicit Drug Use: Declines  STI Testing: Declines at this time      Family History:       Family History          Family History   Problem Relation Age of Onset     Breast Cancer Maternal Grandmother       Breast Cancer Paternal Grandmother       Depression Mother           Reviewed & updated in Epic.      Review of Systems:   Ten point review of systems negative except as stated above in History of Present Illness.     OBJECTIVE   Physical Exam:   There were no vitals taken for this visit.  General: Well-appearing  female resting comfortably in bed, NAD.  HEENT:  NC/AT. Anicteric sclera. Mucous membranes moist.  Neck: Supple  Cardiovascular: RRR. S1/S2. No murmurs appreciated.  Lungs: Normal respiratory effort on RA. Lungs CTAB without wheezes, rales, or rhonchi.  Abdomen: Soft, non-tender, and nondistended with normoactive bowel sounds.  Extremities: Warm & well perfused. No peripheral edema.  Skin: No rashes, jaundice, or lesions on exposed areas of skin.  Neurologic: A&O X 3. Answers questions appropriately. Moves all extremities symmetrically.      Laboratory Data:   No pertinent laboratory data obtained this admission.       Assessment and Plan/Recommendations:   Raissa Ruth is a 22 year old female with history of bipolar disorder, anxiety, depression, and alcohol abuse admitted to station 4A for suicidal ideation.     Bipolar Disorder, Depression, Anxiety, Suicidal Ideation. Presented to OSH ED with plans to cut herself or relapse on alcohol. U tox in ED negative. Admission labs ordered for AM per psychiatry.  - Management per Psychiatry     Medicine will sign off at this time. Please page the Internal Medicine job code pager for any intercurrent medical issues which arise. Thank you for the opportunity to be a part of this patient's care.     Luly Manley PA-C  Hospitalist Service           Hospital Course:   Raissa Ruth was admitted to Station 4A with attending Shamar Henderson MD as a voluntary patient. The patient was placed under status 15 (15 minute checks) to ensure patient safety.     Patient was re-started on her medications including Lithium 300 mg daily, 600 mg at bedtime, Wellbutrin 150 mg daily, Prozac was decreased from 40 mg to 20 mg due to patients reports of irritability which may be driven by too much antidepressant. Buspar 15 mg TID for anxiety. This was increased from 10 mg TID. Risperdal was increased from 2 mg daily to 3 mg daily.  Patient reports feeling much calmer and more in control. Patient uses hydroxyzine for anxiety as  needed. Trazodone is used for sleep.   Discussed risk, benefits and side effects of medications with patient. Patient denies side effects.     Patient was given education on the detrimental effects of mood altering substances on her mental health and reducing the efficacy of her prescribed medications.     Raissa Ruth did participate in groups and was visible in the milieu. The patient's symptoms of suicidal ideation improved. Patient presents with a stable mood and denies suicidal ideation. Patient has protective factors of going to sober living, seeking out treatment and invested in her sobriety.     Patient no longer meets criteria for hospitalization. She is at moderate risk of relapse due to her psychiatric and chemical health history.     Raissa Ruth was released to [facility] (sob living).. At the time of discharge Raissa Ruth was determined to not be a danger to herself or others.          Discharge Medications:     Current Discharge Medication List      START taking these medications    Details   buPROPion (WELLBUTRIN XL) 300 MG 24 hr tablet Take 1 tablet (300 mg) by mouth daily  Qty: 30 tablet, Refills: 1    Associated Diagnoses: Generalized anxiety disorder      busPIRone (BUSPAR) 15 MG tablet Take 1 tablet (15 mg) by mouth 3 times daily  Qty: 90 tablet, Refills: 1    Associated Diagnoses: Generalized anxiety disorder      FLUoxetine (PROZAC) 20 MG capsule Take 1 capsule (20 mg) by mouth daily  Qty: 30 capsule, Refills: 1    Associated Diagnoses: Major depressive disorder, single episode, severe (H)      traZODone (DESYREL) 50 MG tablet Take 1 tablet (50 mg) by mouth nightly as needed for sleep  Qty: 30 tablet, Refills: 1    Associated Diagnoses: Insomnia due to other mental disorder         CONTINUE these medications which have CHANGED    Details   hydrOXYzine (ATARAX) 50 MG tablet Take 1 tablet (50 mg) by mouth every 4 hours as needed for anxiety  Qty: 120 tablet, Refills: 1    Associated  Diagnoses: Generalized anxiety disorder      !! lithium (ESKALITH/LITHOBID) 300 MG CR tablet Take 2 tablets (600 mg) by mouth At Bedtime  Qty: 60 tablet, Refills: 1    Associated Diagnoses: Bipolar affective disorder, currently depressed, moderate (H)      !! lithium (ESKALITH/LITHOBID) 300 MG CR tablet Take 1 tablet (300 mg) by mouth daily  Qty: 30 tablet, Refills: 1    Associated Diagnoses: Bipolar affective disorder, currently depressed, moderate (H)      !! risperiDONE (RISPERDAL) 1 MG tablet Take 1 tablet (1 mg) by mouth daily  Qty: 30 tablet, Refills: 1    Associated Diagnoses: Bipolar affective disorder, currently depressed, moderate (H)      !! risperiDONE (RISPERDAL) 2 MG tablet Take 1 tablet (2 mg) by mouth At Bedtime  Qty: 30 tablet, Refills: 1    Associated Diagnoses: Bipolar affective disorder, currently depressed, moderate (H)       !! - Potential duplicate medications found. Please discuss with provider.      CONTINUE these medications which have NOT CHANGED    Details   medroxyPROGESTERone (DEPO-PROVERA) 150 MG/ML injection Inject 1 mL (150 mg) into the muscle every 3 months  Qty: 3 mL, Refills: 3    Associated Diagnoses: Other and unspecified ovarian cyst; Unspecified symptom associated with female genital organs         STOP taking these medications       LANsoprazole (PREVACID) 15 MG capsule Comments:   Reason for Stopping:                    Psychiatric Examination:   Appearance:  awake, alert and adequately groomed  Attitude:  cooperative  Eye Contact:  good  Mood:  good  Affect:  appropriate and in normal range  Speech:  clear, coherent  Psychomotor Behavior:  no evidence of tardive dyskinesia, dystonia, or tics  Thought Process:  logical, linear and goal oriented  Associations:  no loose associations  Thought Content:  no evidence of suicidal ideation or homicidal ideation  Insight:  fair  Judgment:  fair  Oriented to:  time, person, and place  Attention Span and Concentration:  intact  Recent  and Remote Memory:  intact  Language: Able to name objects, Able to repeat phrases and Able to read and write  Fund of Knowledge: appropriate  Muscle Strength and Tone: normal  Gait and Station: Normal         Discharge Plan:         Summary: You were admitted on 2/6/2018 to Station [address] [place] [address] due to Suicidal Ideation.  You were treated by Debra Naegele and discharged on 02/09/2018 to Substance Abuse Treatment Program at [facility] with sober housing     Principal Diagnosis:   Bipolar disorder  Borderline personality disorder.   Cannabis use disorder, moderate, in remission.   Amphetamine use disorder, moderate, in remission.   Benzodiazepine use disorder, moderate, in remission.   History of oppositional defiant disorder.   History of eating disorder in remission.   History of attention deficit hyperactivity disorder.      Health Care Follow-up Appointments:   Medication Management  Date: 02/15/2018  Time: 10:00am      Provider: Dr. Dayan Akers  Address: [address] [place] [address]  Phone:[phone]   [facility] has faxed the Discharge Summary and AVS to this provider at Fax: [phone]       Therapy Appointment   Date: 02/13/2018  Time: 7:30AM      Provider: Mirna Lee  Address: [address] [place] [address]  Phone:[phone]           Attend all scheduled appointments with your outpatient providers. Call at least 24 hours in advance if you need to reschedule an appointment to ensure continued access to your outpatient providers.   Major Treatments, Procedures and Findings: You were provided with: a psychiatric assessment, assessed for medical stability, medication evaluation and/or management, group therapy, art therapy, milieu management, medical interventions and skills/OT groups.     Symptoms to Report: If you experience any of the following symptoms please report them right away to your provider or to family/friends; feeling more aggressive, increased confusion, losing more  "sleep, mood getting worse or thoughts of suicide.     Early warning signs can include: Early warning signs that could signal a potential relapse could include but not limited to the following; increased depression or anxiety sleep disturbances increased thoughts or behaviors of suicide or self-harm increased unusual thinking, such as paranoia or hearing voices.      Safety and Wellness:  Take all medicines as directed.  Make no changes unless your doctor suggests them. Follow treatment recommendations.  Refrain from alcohol and non-prescribed drugs. If there is a concern for safety, call 911.     Resources: Mental health crisis response for your [facility] is offered 24 hours a day, 7 days a week. A trained counselor will assess your current situation, offer support and counseling and connect you with local resources. Please call  [facility] Crisis (COPE) Response - Adult [phone]     Crisis Intervention: 532.151.6015 or 934-475-5153 (TTY: 243.748.4904).  Call anytime for help.  National [place] on Mental Illness (www.[place].org): 950.805.9594 or 470-688-4990.  Suicide Awareness Voices of Education (SAVE) (www.save.org): 102-529-HGSG (1521)  National Suicide Prevention Line (www.mentalhealthmn.org): 253-734-TEWI (5693)  Text 4 Life: txt \"LIFE\" to 46010 for immediate support and crisis intervention  Crisis text line: Text \"START\" to 807-196. Free, confidential, 24/7.  Crisis Intervention: 858.864.4271 or 328-004-0326. Call anytime for help.      The treatment team has appreciated the opportunity to work with you. Raissa, please take care and make your recovery a daily recovery. If you have any questions or concerns our unit number is [phone].  You will be receiving a follow-up phone call within the next three days from a representative from behavioral health.  You have identified the best phone number to reach you as [phone] (home) [phone] (work)        Attestation:  The patient has been seen and " evaluated by me, Debra A. Naegele, APRN CNS on 2/9/2018  Discharge summary time > 30 minutes

## 2018-02-09 NOTE — PROGRESS NOTES
Late entry: Checked in with patient 02/08/18. Patient states that at this time she is fine and does not need anything.

## 2018-02-09 NOTE — PROGRESS NOTES
[facility] Relapse Prevention Plan  This writer met with patient to discuss and complete relapse prevention plan.

## 2018-02-09 NOTE — PROGRESS NOTES
"Dicussed with patient his/her Personal Plan of Care.      \"Reasons you are in the hospital;\" The patient identifies the following reasons for current hospitalization:   Overwhelmed with life  Emotional shutdown  Not med compliant      \"Goals for Discharge\" The patient identifies the following goals for discharge:  Be compliant with meds  Make Organized list of things I need to do.     "

## 2018-02-09 NOTE — PLAN OF CARE
Problem: Behavioral Disturbance  Goal: Behavioral Disturbance  Signs and symptoms of listed problems will be absent or manageable by discharge or transition of care.       Pt has been doing quite well. She is in the milieu and some what social. She has been playing the guitar most of the evening by the front of the desk. Pt mostly keeps to herself but has presented as appropriate. Pt denies SI or hallucinations.

## 2018-02-09 NOTE — PROGRESS NOTES
Attendence: Pt. Attended scheduled 2 of 3 OT sessions today.   Observations: pt participated in AM groups with positive affect, contributing to group discussion and reporting interest in journal/sketchbook groups. Pt was able to identify the therapeutic benefits of journaling and made a personalized journal during the group. The pt engaged with peers and remained focused on group for duration of group and discharged prior to PM group.     02/09/18 1600   Occupational Therapy   Type of Intervention structured groups   Response Participates   Hours 2

## 2018-02-09 NOTE — DISCHARGE INSTRUCTIONS
Behavioral Discharge Planning and Instructions      Summary: You were admitted on 2/6/2018 to [facility] [address] [place] [address] due to Suicidal Ideation.  You were treated by Debra Naegele and discharged on 02/09/2018 to Substance Abuse Treatment Program at [facility] with sober housing    Principal Diagnosis:   Bipolar disorder  Borderline personality disorder.   Cannabis use disorder, moderate, in remission.   Amphetamine use disorder, moderate, in remission.   Benzodiazepine use disorder, moderate, in remission.   History of oppositional defiant disorder.   History of eating disorder in remission.   History of attention deficit hyperactivity disorder.     Health Care Follow-up Appointments:   Medication Management  Date: 02/15/2018  Time: 10:00am      Provider: Dr. Dayan Akers  Address: [address] [place] [address]  Phone:[phone]   [facility] has faxed the Discharge Summary and AVS to this provider at Fax: [phone]      Therapy Appointment   Date: 02/13/2018  Time: 7:30AM      Provider: Mirna Lee  Address: [address] [place] [address]  Phone:[phone]          Attend all scheduled appointments with your outpatient providers. Call at least 24 hours in advance if you need to reschedule an appointment to ensure continued access to your outpatient providers.   Major Treatments, Procedures and Findings: You were provided with: a psychiatric assessment, assessed for medical stability, medication evaluation and/or management, group therapy, art therapy, milieu management, medical interventions and skills/OT groups.    Symptoms to Report: If you experience any of the following symptoms please report them right away to your provider or to family/friends; feeling more aggressive, increased confusion, losing more sleep, mood getting worse or thoughts of suicide.    Early warning signs can include: Early warning signs that could signal a potential relapse could include but not limited to the following;  "increased depression or anxiety sleep disturbances increased thoughts or behaviors of suicide or self-harm increased unusual thinking, such as paranoia or hearing voices.     Safety and Wellness:  Take all medicines as directed.  Make no changes unless your doctor suggests them. Follow treatment recommendations.  Refrain from alcohol and non-prescribed drugs. If there is a concern for safety, call 911.    Resources: Mental health crisis response for your [facility] is offered 24 hours a day, 7 days a week. A trained counselor will assess your current situation, offer support and counseling and connect you with local resources. Please call  [facility] Crisis (COPE) Response - Adult [phone]    Crisis Intervention: 215.744.7491 or 422-606-9447 (TTY: 724.960.4759).  Call anytime for help.  National [place] on [place] Illness (www.mn.олгьа.org): 188.579.5795 or 046-819-9765.  Suicide Awareness Voices of Education (SAVE) (www.save.org): 205-918-FBSX (6256)  National Suicide Prevention Line (www.mentalhealthmn.org): 588-326-JWSG (5109)  Text 4 Life: txt \"LIFE\" to 527[address] for immediate support and crisis intervention  Crisis text line: Text \"START\" to 844-211. Free, confidential, 24/7.  Crisis Intervention: 676.311.2964 or 981-097-3160. Call anytime for help.     The treatment team has appreciated the opportunity to work with you. Raissa, please take care and make your recovery a daily recovery. If you have any questions or concerns our unit number is [phone].  You will be receiving a follow-up phone call within the next three days from a representative from behavioral health.  You have identified the best phone number to reach you as [phone] (home) [phone] (work)      "

## 2018-05-01 ENCOUNTER — HOSPITAL ENCOUNTER (EMERGENCY)
Facility: CLINIC | Age: 23
Discharge: HOME | End: 2018-05-01
Attending: EMERGENCY MEDICINE | Admitting: EMERGENCY MEDICINE
Payer: COMMERCIAL

## 2018-05-01 VITALS
DIASTOLIC BLOOD PRESSURE: 82 MMHG | SYSTOLIC BLOOD PRESSURE: 121 MMHG | HEART RATE: 90 BPM | TEMPERATURE: 97.8 F | RESPIRATION RATE: 16 BRPM | OXYGEN SATURATION: 97 %

## 2018-05-01 DIAGNOSIS — F41.9 ANXIETY: ICD-10-CM

## 2018-05-01 DIAGNOSIS — F41.1 GAD (GENERALIZED ANXIETY DISORDER): ICD-10-CM

## 2018-05-01 LAB
AMPHETAMINES UR QL SCN: NEGATIVE
BARBITURATES UR QL: NEGATIVE
BENZODIAZ UR QL: NEGATIVE
CANNABINOIDS UR QL SCN: NEGATIVE
COCAINE UR QL: NEGATIVE
ETHANOL UR QL SCN: NEGATIVE
HCG UR QL: NEGATIVE
OPIATES UR QL SCN: NEGATIVE

## 2018-05-01 PROCEDURE — 99285 EMERGENCY DEPT VISIT HI MDM: CPT | Mod: 25 | Performed by: EMERGENCY MEDICINE

## 2018-05-01 PROCEDURE — 90791 PSYCH DIAGNOSTIC EVALUATION: CPT

## 2018-05-01 PROCEDURE — 80307 DRUG TEST PRSMV CHEM ANLYZR: CPT | Performed by: EMERGENCY MEDICINE

## 2018-05-01 PROCEDURE — 81025 URINE PREGNANCY TEST: CPT | Performed by: EMERGENCY MEDICINE

## 2018-05-01 PROCEDURE — 80320 DRUG SCREEN QUANTALCOHOLS: CPT | Performed by: EMERGENCY MEDICINE

## 2018-05-01 PROCEDURE — 99284 EMERGENCY DEPT VISIT MOD MDM: CPT | Mod: Z6 | Performed by: EMERGENCY MEDICINE

## 2018-05-01 PROCEDURE — 25000132 ZZH RX MED GY IP 250 OP 250 PS 637: Performed by: EMERGENCY MEDICINE

## 2018-05-01 RX ORDER — RISPERIDONE 2 MG/1
2 TABLET ORAL ONCE
Status: COMPLETED | OUTPATIENT
Start: 2018-05-01 | End: 2018-05-01

## 2018-05-01 RX ADMIN — RISPERIDONE 2 MG: 2 TABLET ORAL at 02:37

## 2018-05-01 ASSESSMENT — ENCOUNTER SYMPTOMS
NERVOUS/ANXIOUS: 1
LIGHT-HEADEDNESS: 0
CHILLS: 0
BRUISES/BLEEDS EASILY: 0
NECK PAIN: 0
FATIGUE: 0
TREMORS: 0
DIZZINESS: 0
CONFUSION: 0
SLEEP DISTURBANCE: 0
AGITATION: 0
RHINORRHEA: 0
ABDOMINAL PAIN: 0
FEVER: 0
CHEST TIGHTNESS: 0
VOMITING: 0
DIAPHORESIS: 0
ARTHRALGIAS: 0
SORE THROAT: 0
SHORTNESS OF BREATH: 0
NECK STIFFNESS: 0
HALLUCINATIONS: 0
PALPITATIONS: 0
HEADACHES: 0
APPETITE CHANGE: 0
WEAKNESS: 0
NAUSEA: 0
CONSTITUTIONAL NEGATIVE: 1
COUGH: 0

## 2018-05-01 NOTE — DISCHARGE INSTRUCTIONS
Follow up this week with your therapist and psychiatrist.  Check with your pharmacy tomorrow about your Risperdal prescription.  Return if persistent symptoms, especially if you feel unsafe or are having thoughts of harming yourself.

## 2018-05-01 NOTE — ED AVS SNAPSHOT
[facility], Emergency Department    [address]    [facility] [address]    Phone:  [phone]    Fax:  [phone]                                       Raissa Ruth   MRN: [id number]    Department:  [facility], Emergency Department   Date of Visit:  5/1/2018           After Visit Summary Signature Page     I have received my discharge instructions, and my questions have been answered. I have discussed any challenges I see with this plan with the nurse or doctor.    ..........................................................................................................................................  Patient/Patient Representative Signature      ..........................................................................................................................................  Patient Representative Print Name and Relationship to Patient    ..................................................               ................................................  Date                                            Time    ..........................................................................................................................................  Reviewed by Signature/Title    ...................................................              ..............................................  Date                                                            Time

## 2018-05-01 NOTE — ED AVS SNAPSHOT
[facility], Emergency Department    [address]    [place] [address]    Phone:  [phone]    Fax:  [phone]                                       Raissa Ruth   MRN: [id number]    Department:  [facility], Emergency Department   Date of Visit:  5/1/2018           Patient Information     Date Of Birth          1995        Your diagnoses for this visit were:     Anxiety     YAMINI (generalized anxiety disorder)        You were seen by Jonny Marsh MD.      Follow-up Information     Follow up with Clinic, [facility].    Contact information:    [address]  [facility] [id number] [phone]          Discharge Instructions       Follow up this week with your therapist and psychiatrist.  Check with your pharmacy tomorrow about your Risperdal prescription.  Return if persistent symptoms, especially if you feel unsafe or are having thoughts of harming yourself.    24 Hour Appointment Hotline       To make an appointment at any [facility], call [phone] ([phone]). If you don't have a family doctor or clinic, we will help you find one. [facility] clinics are conveniently located to serve the needs of you and your family.             Review of your medicines      Our records show that you are taking the medicines listed below. If these are incorrect, please call your family doctor or clinic.        Dose / Directions Last dose taken    buPROPion 300 MG 24 hr tablet   Commonly known as:  WELLBUTRIN XL   Dose:  300 mg   Quantity:  30 tablet        Take 1 tablet (300 mg) by mouth daily   Refills:  1        busPIRone 15 MG tablet   Commonly known as:  BUSPAR   Dose:  15 mg   Quantity:  90 tablet        Take 1 tablet (15 mg) by mouth 3 times daily   Refills:  1        FLUoxetine 20 MG capsule   Commonly known as:  PROzac   Dose:  20 mg   Quantity:  30 capsule        Take 1 capsule (20 mg) by mouth daily   Refills:  1        hydrOXYzine 50 MG tablet   Commonly  known as:  ATARAX   Dose:  50 mg   Quantity:  120 tablet        Take 1 tablet (50 mg) by mouth every 4 hours as needed for anxiety   Refills:  1        * lithium 300 MG CR tablet   Commonly known as:  ESKALITH/LITHOBID   Dose:  600 mg   Quantity:  60 tablet        Take 2 tablets (600 mg) by mouth At Bedtime   Refills:  1        * lithium 300 MG CR tablet   Commonly known as:  ESKALITH/LITHOBID   Dose:  300 mg   Quantity:  30 tablet        Take 1 tablet (300 mg) by mouth daily   Refills:  1        * risperiDONE 2 MG tablet   Commonly known as:  risperDAL   Dose:  2 mg   Quantity:  30 tablet        Take 1 tablet (2 mg) by mouth At Bedtime   Refills:  1        * risperiDONE 1 MG tablet   Commonly known as:  risperDAL   Dose:  1 mg   Quantity:  30 tablet        Take 1 tablet (1 mg) by mouth daily   Refills:  1        traZODone 50 MG tablet   Commonly known as:  DESYREL   Dose:  50 mg   Quantity:  30 tablet        Take 1 tablet (50 mg) by mouth nightly as needed for sleep   Refills:  1        * Notice:  This list has 4 medication(s) that are the same as other medications prescribed for you. Read the directions carefully, and ask your doctor or other care provider to review them with you.            Procedures and tests performed during your visit     Drug abuse screen 6 urine (tox)    HCG qualitative urine      Orders Needing Specimen Collection     None      Pending Results     Date and Time Order Name Status Description    5/1/2018 0221 Drug abuse screen 6 urine (tox) In process             Pending Culture Results     Date and Time Order Name Status Description    5/1/2018 0221 Drug abuse screen 6 urine (tox) In process             Pending Results Instructions     If you had any lab results that were not finalized at the time of your Discharge, you can call the ED Lab Result RN at [phone]. You will be contacted by this team for any positive Lab results or changes in treatment. The nurses are available 7 days a  "week from 10A to 6:30P.  You can leave a message 24 hours per day and they will return your call.        Thank you for choosing [facility]       Thank you for choosing [facility] for your care. Our goal is always to provide you with excellent care. Hearing back from our patients is one way we can continue to improve our services. Please take a few minutes to complete the written survey that you may receive in the mail after you visit with us. Thank you!        [employer] Information     [employer] lets you send messages to your doctor, view your test results, renew your prescriptions, schedule appointments and more. To sign up, go to [email or web address] . Click on \"Log in\" on the left side of the screen, which will take you to the Welcome page. Then click on \"Sign up Now\" on the right side of the page.     You will be asked to enter the access code listed below, as well as some personal information. Please follow the directions to create your username and password.     Your access code is: [email or web address]  Expires: 5/10/2018  1:14 PM     Your access code will  in 90 days. If you need help or a new code, please call your [facility] clinic or [phone].        Care EveryWhere ID     This is your Care EveryWhere ID. This could be used by other organizations to access your [facility] medical records  [id number]        Equal Access to Services     OFELIA LOFTON : Hadii dao Solares, waaxda luqadaha, qaybta kaalmada pennie, nikko giraldo. So [facility] [phone].    ATENCIÓN: Si habla español, tiene a byers disposición servicios gratuitos de asistencia lingüística. Teodoro al [phone].    We comply with applicable federal civil rights laws and [place] laws. We do not discriminate on the basis of race, color, national origin, age, disability, sex, sexual orientation, or gender identity.            After Visit Summary       This is your record. Keep this with you and show to your " community pharmacist(s) and doctor(s) at your next visit.

## 2018-05-01 NOTE — ED PROVIDER NOTES
"  History     Chief Complaint   Patient presents with     Psychiatric Evaluation     \"I'm losing touch with reality. I want to cut really bad.\" Chronic thoughts of self harm.Recent scars on her arm. Has been present for 5 weeks-razor cuts.     HPI  Raissa Ruth is a 22 year old female with history of bipolar disorder, depression, anxiety and substance abuse who presents with increased anxiety and thoughts of self harm by cutting herself. No suicidal or homicidal ideation. Did not cut herself. She has been sober for 6 months. Denies recent illness or injury. States she ran out of her Risperdal and thinks her pharmacy has a refillable prescription which she has not picked up. Denies hallucinations or delusions.     I have reviewed the Medications, Allergies, Past Medical and Surgical History, and Social History in the [employer] system.  Past Medical History:   Diagnosis Date     Anxiety      Bipolar 1 disorder (H)      Depression      Substance abuse        Review of Systems   Constitutional: Negative.  Negative for appetite change, chills, diaphoresis, fatigue and fever.   HENT: Negative for congestion, rhinorrhea and sore throat.    Respiratory: Negative for cough, chest tightness and shortness of breath.    Cardiovascular: Negative for chest pain, palpitations and leg swelling.   Gastrointestinal: Negative for abdominal pain, nausea and vomiting.   Musculoskeletal: Negative for arthralgias, gait problem, neck pain and neck stiffness.   Skin: Negative for rash.   Allergic/Immunologic: Negative for immunocompromised state.   Neurological: Negative for dizziness, tremors, syncope, weakness, light-headedness and headaches.   Hematological: Does not bruise/bleed easily.   Psychiatric/Behavioral: Negative for agitation, confusion, hallucinations, self-injury, sleep disturbance and suicidal ideas. The patient is nervous/anxious.        Physical Exam   BP: 137/79  Pulse: 94  Temp: 98.3  F (36.8  C)  Resp: 16  SpO2: 97 " %      Physical Exam   Constitutional: She is oriented to person, place, and time. She appears well-developed and well-nourished. No distress.   HENT:   Head: Normocephalic and atraumatic.   Mouth/Throat: Oropharynx is clear and moist.   Eyes: Conjunctivae and EOM are normal. Pupils are equal, round, and reactive to light.   Neck: Normal range of motion. Neck supple.   Cardiovascular: Normal rate, regular rhythm, normal heart sounds and intact distal pulses.    Pulmonary/Chest: Effort normal and breath sounds normal. No respiratory distress.   Abdominal: Soft. There is no tenderness.   Musculoskeletal: She exhibits no edema or tenderness.   Neurological: She is alert and oriented to person, place, and time.   Skin: Skin is warm and dry.   Psychiatric: Her speech is normal and behavior is normal. Her mood appears anxious. She is not agitated, not actively hallucinating and not combative. Thought content is delusional. Thought content is not paranoid. She expresses no homicidal and no suicidal ideation.   Fair judgement and insight.   Nursing note and vitals reviewed.      ED Course     ED Course     Procedures             Critical Care time:  none             Labs Ordered and Resulted from Time of ED Arrival Up to the Time of Departure from the ED   DRUG ABUSE SCREEN 6 CHEM DEP URINE ([facility])   HCG QUALITATIVE URINE            Assessments & Plan (with Medical Decision Making)   Anxiety. Earlier today had thoughts of cutting herself but denies this now. She currently denies any thoughts of harming herself or others. She denies suicidal ideation. She states she has been off her Risperdal for 3 days. She is given her pm dose and instructed to check with her pharmacy and prescribing doctor tomorrow. Instructed to return if persistent symptoms. She feels safe and wants to return to her sober house. See also mental health  note.    I have reviewed the nursing notes.    I have reviewed the findings, diagnosis, plan  and need for follow up with the patient.    Discharge Medication List as of 5/1/2018  2:52 AM          Final diagnoses:   Anxiety   YAMINI (generalized anxiety disorder)       5/1/2018   [facility] [place], EMERGENCY DEPARTMENT     Jonny Marsh MD  05/01/18 0318

## 2018-10-26 ENCOUNTER — OFFICE VISIT (OUTPATIENT)
Dept: OBGYN | Facility: CLINIC | Age: 23
End: 2018-10-26
Payer: COMMERCIAL

## 2018-10-26 VITALS
WEIGHT: 181.7 LBS | HEIGHT: 67 IN | OXYGEN SATURATION: 97 % | HEART RATE: 101 BPM | BODY MASS INDEX: 28.52 KG/M2 | SYSTOLIC BLOOD PRESSURE: 122 MMHG | DIASTOLIC BLOOD PRESSURE: 73 MMHG

## 2018-10-26 DIAGNOSIS — R10.2 PELVIC PAIN IN FEMALE: ICD-10-CM

## 2018-10-26 DIAGNOSIS — Z00.00 ANNUAL PHYSICAL EXAM: Primary | ICD-10-CM

## 2018-10-26 DIAGNOSIS — Z11.3 SCREEN FOR STD (SEXUALLY TRANSMITTED DISEASE): ICD-10-CM

## 2018-10-26 DIAGNOSIS — N91.1 SECONDARY AMENORRHEA: ICD-10-CM

## 2018-10-26 DIAGNOSIS — Z71.6 ENCOUNTER FOR SMOKING CESSATION COUNSELING: ICD-10-CM

## 2018-10-26 LAB
B-HCG SERPL-ACNC: <1 IU/L (ref 0–5)
FSH SERPL-ACNC: 2.3 IU/L
LH SERPL-ACNC: 7.5 IU/L
PROLACTIN SERPL-MCNC: 7 UG/L (ref 3–27)
SPECIMEN SOURCE: NORMAL
T4 FREE SERPL-MCNC: 1.04 NG/DL (ref 0.76–1.46)
TSH SERPL DL<=0.005 MIU/L-ACNC: 0.36 MU/L (ref 0.4–4)
WET PREP SPEC: NORMAL

## 2018-10-26 PROCEDURE — 87389 HIV-1 AG W/HIV-1&-2 AB AG IA: CPT | Performed by: OBSTETRICS & GYNECOLOGY

## 2018-10-26 PROCEDURE — 87210 SMEAR WET MOUNT SALINE/INK: CPT | Performed by: OBSTETRICS & GYNECOLOGY

## 2018-10-26 PROCEDURE — 83002 ASSAY OF GONADOTROPIN (LH): CPT | Performed by: OBSTETRICS & GYNECOLOGY

## 2018-10-26 PROCEDURE — 87491 CHLMYD TRACH DNA AMP PROBE: CPT | Performed by: OBSTETRICS & GYNECOLOGY

## 2018-10-26 PROCEDURE — 84439 ASSAY OF FREE THYROXINE: CPT | Performed by: OBSTETRICS & GYNECOLOGY

## 2018-10-26 PROCEDURE — 87591 N.GONORRHOEAE DNA AMP PROB: CPT | Performed by: OBSTETRICS & GYNECOLOGY

## 2018-10-26 PROCEDURE — 84403 ASSAY OF TOTAL TESTOSTERONE: CPT | Performed by: OBSTETRICS & GYNECOLOGY

## 2018-10-26 PROCEDURE — 99395 PREV VISIT EST AGE 18-39: CPT | Performed by: OBSTETRICS & GYNECOLOGY

## 2018-10-26 PROCEDURE — 86780 TREPONEMA PALLIDUM: CPT | Performed by: OBSTETRICS & GYNECOLOGY

## 2018-10-26 PROCEDURE — 84443 ASSAY THYROID STIM HORMONE: CPT | Performed by: OBSTETRICS & GYNECOLOGY

## 2018-10-26 PROCEDURE — 36415 COLL VENOUS BLD VENIPUNCTURE: CPT | Performed by: OBSTETRICS & GYNECOLOGY

## 2018-10-26 PROCEDURE — 83001 ASSAY OF GONADOTROPIN (FSH): CPT | Performed by: OBSTETRICS & GYNECOLOGY

## 2018-10-26 PROCEDURE — 84270 ASSAY OF SEX HORMONE GLOBUL: CPT | Performed by: OBSTETRICS & GYNECOLOGY

## 2018-10-26 PROCEDURE — 84702 CHORIONIC GONADOTROPIN TEST: CPT | Performed by: OBSTETRICS & GYNECOLOGY

## 2018-10-26 PROCEDURE — G0145 SCR C/V CYTO,THINLAYER,RESCR: HCPCS | Performed by: OBSTETRICS & GYNECOLOGY

## 2018-10-26 PROCEDURE — 87340 HEPATITIS B SURFACE AG IA: CPT | Performed by: OBSTETRICS & GYNECOLOGY

## 2018-10-26 PROCEDURE — 84146 ASSAY OF PROLACTIN: CPT | Performed by: OBSTETRICS & GYNECOLOGY

## 2018-10-26 RX ORDER — MEDROXYPROGESTERONE ACETATE 10 MG
10 TABLET ORAL DAILY
Qty: 30 TABLET | Refills: 3 | Status: SHIPPED | OUTPATIENT
Start: 2018-10-26 | End: 2019-10-04

## 2018-10-26 ASSESSMENT — PATIENT HEALTH QUESTIONNAIRE - PHQ9
SUM OF ALL RESPONSES TO PHQ QUESTIONS 1-9: 4
5. POOR APPETITE OR OVEREATING: NOT AT ALL

## 2018-10-26 ASSESSMENT — ANXIETY QUESTIONNAIRES
5. BEING SO RESTLESS THAT IT IS HARD TO SIT STILL: NOT AT ALL
GAD7 TOTAL SCORE: 7
3. WORRYING TOO MUCH ABOUT DIFFERENT THINGS: SEVERAL DAYS
7. FEELING AFRAID AS IF SOMETHING AWFUL MIGHT HAPPEN: NOT AT ALL
2. NOT BEING ABLE TO STOP OR CONTROL WORRYING: SEVERAL DAYS
IF YOU CHECKED OFF ANY PROBLEMS ON THIS QUESTIONNAIRE, HOW DIFFICULT HAVE THESE PROBLEMS MADE IT FOR YOU TO DO YOUR WORK, TAKE CARE OF THINGS AT HOME, OR GET ALONG WITH OTHER PEOPLE: SOMEWHAT DIFFICULT
6. BECOMING EASILY ANNOYED OR IRRITABLE: NEARLY EVERY DAY
1. FEELING NERVOUS, ANXIOUS, OR ON EDGE: MORE THAN HALF THE DAYS

## 2018-10-26 NOTE — PROGRESS NOTES
Raissa is a 23 year old female, , who is here for her annual exam.  She has not had a menses and has noted mild pelvic pain, off and on, for the past 1 1/2 years.  She is sexually active with both male and female partners and uses condoms each time, she states.  She would like STD screening but denies any known pregnancy exposure.  She tried Depo Provera x one injection but disliked the spotting and weight gain so declines to restart.  She smokes 1/2 ppd and plans to treat with Nicorette gum and declines a script for other forms of treatment.  She also declines a flu vaccine.  She is following with Mental Health for depression/suicide issues.    ROS: Ten point review of systems was reviewed and negative except the above.    Health Maintenance   Topic Date Due     HPV IMMUNIZATION (1 of 3 - Female 3 Dose Series) 2007     DEPRESSION ACTION PLAN Q1 YR  2014     HIV SCREEN (SYSTEM ASSIGNED)  2014     PHQ-9 Q6 MONTHS  2016     CHLAMYDIA SCREENING  2016     PAP SCREENING Q3 YR (SYSTEM ASSIGNED)  2017     TETANUS IMMUNIZATION (SYSTEM ASSIGNED)  2018     INFLUENZA VACCINE (1) 2018      Last pap: due  Last Mammogram: not applicable  Last Dexa: not applicable  Last Colonoscopy: not applicable  Lab Results   Component Value Date    CHOL 149 2018     Lab Results   Component Value Date    HDL 52 2018     Lab Results   Component Value Date    LDL 83 2018     Lab Results   Component Value Date    TRIG 72 2018     No results found for: CHOLHDLRATIO      OBHX:      PSH:   Past Surgical History:   Procedure Laterality Date     APPENDECTOMY       HC TOOTH EXTRACTION W/FORCEP           PMH: Her past medical, surgical, and obstetric histories were reviewed and are documented in their appropriate chart areas.    ALL/Meds: Her medication and allergy histories were reviewed and are documented in their appropriate chart areas.    SH/FMH: Her social and family  "history was reviewed and documented in its appropriate chart area.    PE: /73  Pulse 101  Ht 5' 6.75\" (1.695 m)  Wt 181 lb 11.2 oz (82.4 kg)  SpO2 97%  Breastfeeding? No  BMI 28.67 kg/m2  Body mass index is 28.67 kg/(m^2).    General Appearance:  healthy, alert, active, no distress  Cardiovascular:  Regular rate and Rhythm without murmur  Neck: Supple, no adenopathy and thyroid normal  Lungs:  Clear, without wheeze, rale or rhonchi  Breast: normal breast exam  Abdomen: Benign, Soft, flat, non-tender, No masses, organomegaly, No inguinal nodes and Bowel sounds normoactive.   Pelvic:       - Ext: Vulva and perineum are normal without lesion, mass or discharge        - Urethra: normal without discharge        - Urethral Meatus: normal appearance       - Bladder: no tenderness, no masses       - Vagina: Normal mucosa, no discharge        - Cervix: normal and nulliparous       - Uterus:Normal shape, position and consistency        - Adnexa: Normal without masses or tenderness       - Rectal: deferred    A/P:  Well Woman Exam     -  I discussed the new pap recommendations regarding screening.  Explained the rationale for increased intervals between paps.  Questions asked and answered.  She does agree to this regiment.  Pap was obtained and submitted   -  BC: condoms and she is not interested in hormonal methods   -  Check labwork as explained including STDs per patient request   -  We discussed smoking cessation and she declined prescription methods.  She prefers to try the Nicorette gum that she already has at home.   -  Prescribed Provera and instructions were reviewed.  She voiced understanding that this is NOT a contraception and she will need to use foam and condoms during use.   -  Schedule a pelvic US due to secondary amenorrhea and pelvic pain issues  Orders Placed This Encounter   Procedures     US Pelvic Complete w Transvaginal     Pap imaged thin layer screen only - recommended age 21 - 24 years     " Hepatitis B surface antigen     HIV Antigen Antibody Combo     Treponema Abs w Reflex to RPR and Titer     Prolactin     Follicle stimulating hormone     Lutropin     TSH with free T4 reflex     **Testosterone Free and Total FUTURE anytime      - Encouraged self-breast exam   - Encouraged low fat diet, regular exercise, and adequate calcium intake.    La Eduardo,   FACOG, FACS

## 2018-10-26 NOTE — LETTER
November 1, 2018      Raissa Ruth  [address] [facility] [facility] [address]    Dear ,      I am happy to inform you that your recent cervical cancer screening test (PAP smear) was normal.      Preventative screenings such as this help to ensure your health for years to come. You should repeat a pap smear in 3 years, unless otherwise directed.      You will still need to return to the clinic every year for your annual exam and other preventive tests.     If you have additional questions regarding this result, please call our registered nurse, [place] at [phone].      Sincerely,      La Eduardo DO/sancho

## 2018-10-26 NOTE — PATIENT INSTRUCTIONS
If you have any questions regarding your visit, Please contact your care team.    [employer]: [phone]      [facility] CLINIC HOURS TELEPHONE NUMBER   La Eduardo DO.    MARTHA Garg -    АЛЕКСАНДР Martínez       Monday, Wednesday, Thursday and Friday, [place]  8:30a.m-5:00 p.m   [facility]  [address] [place] [address]  [phone] ask for [facility]    Imaging [phone]       Urgent Care locations:    [facility] Saturday and Sunday   9 am - 5 pm    Monday-Friday   12 pm - 8 pm  Saturday and Sunday   9 am - 5 pm   [phone]     [facility] Labor and Delivery:  [phone]    If you need a medication refill, please contact your pharmacy. Please allow 3 business days for your refill to be completed.  As always, Thank you for trusting us with your healthcare needs!

## 2018-10-26 NOTE — MR AVS SNAPSHOT
After Visit Summary   10/26/2018    Raissa Ruth    MRN: [id number]           Patient Information     Date Of Birth          1995        Visit Information        Provider Department      10/26/2018 10:00 AM La Eduardo DO [facility]        Today's Diagnoses     Annual physical exam    -  1    Screen for STD (sexually transmitted disease)          Care Instructions                                                         If you have any questions regarding your visit, Please contact your care team.    [facility]: [phone]      [facility] CLINIC HOURS TELEPHONE NUMBER   La Eduardo DO.    MARTHA Garg -    АЛЕКСАНДР Martínez       Monday, Wednesday, Thursday and Friday, [place]  8:30a.m-5:00 p.m   [facility]  [address] [place] [address] [phone] ask for [facility]    Imaging [phone]       Urgent Care locations:    [facility] Saturday and Sunday   9 am - 5 pm    Monday-Friday   12 pm - 8 pm  Saturday and Sunday   9 am - 5 pm   [phone]     [facility] Labor and Delivery:  [phone]    If you need a medication refill, please contact your pharmacy. Please allow 3 business days for your refill to be completed.  As always, Thank you for trusting us with your healthcare needs!                Follow-ups after your visit        Your next 10 appointments already scheduled     Oct 26, 2018 10:00 AM CDT   Office Visit with La Eduardo DO   [facility] ([facility])    [address] [place] [id number] [phone]           Bring a current list of meds and any records pertaining to this visit. For Physicals, please bring immunization records and any forms needing to be filled out. Please arrive 10 minutes early to complete paperwork.              Who to contact   "   If you have questions or need follow up information about today's clinic visit or your schedule please contact [facility] directly at [phone].  Normal or non-critical lab and imaging results will be communicated to you by MyChart, letter or phone within 4 business days after the clinic has received the results. If you do not hear from us within 7 days, please contact the clinic through MyChart or phone. If you have a critical or abnormal lab result, we will notify you by phone as soon as possible.  Submit refill requests through [email or web address] or call your pharmacy and they will forward the refill request to us. Please allow 3 business days for your refill to be completed.          Additional Information About Your Visit        Care EveryWhere ID     This is your Care EveryWhere ID. This could be used by other organizations to access your [place] medical records  [id number]        Your Vitals Were     Pulse Height Pulse Oximetry Breastfeeding? BMI (Body Mass Index)       101 5' 6.75\" (1.695 m) 97% No 28.67 kg/m2        Blood Pressure from Last 3 Encounters:   10/26/18 122/73   05/01/18 121/82   02/09/18 118/59    Weight from Last 3 Encounters:   10/26/18 181 lb 11.2 oz (82.4 kg)   02/08/18 151 lb 3.2 oz (68.6 kg)   05/25/17 125 lb 3.2 oz (56.8 kg)              We Performed the Following     Chlamydia trachomatis PCR     Hepatitis B surface antigen     HIV Antigen Antibody Combo     Neisseria gonorrhoeae PCR     Pap imaged thin layer screen only - recommended age 21 - 24 years     Treponema Abs w Reflex to RPR and Titer     Wet prep        Primary Care Provider Office Phone # Fax #    [facility] [phone]       [address]  [facility] [address]        Equal Access to Services     OFELIA LOFTON : Marine Solares, evie albarado, qafozia kaalnikko armendariz. So [facility] [phone].    ATENCIÓN: Si " taryn ovalle, tiene a byers disposición servicios gratuitos de asistencia lingüística. Teodoro nelson [phone].    We comply with applicable federal civil rights laws and [place] laws. We do not discriminate on the basis of race, color, national origin, age, disability, sex, sexual orientation, or gender identity.            Thank you!     Thank you for choosing [facility]  for your care. Our goal is always to provide you with excellent care. Hearing back from our patients is one way we can continue to improve our services. Please take a few minutes to complete the written survey that you may receive in the mail after your visit with us. Thank you!             Your Updated Medication List - Protect others around you: Learn how to safely use, store and throw away your medicines at www.disposemymeds.org.          This list is accurate as of 10/26/18  9:55 AM.  Always use your most recent med list.                   Brand Name Dispense Instructions for use Diagnosis    buPROPion 300 MG 24 hr tablet    WELLBUTRIN XL    30 tablet    Take 1 tablet (300 mg) by mouth daily    Generalized anxiety disorder       busPIRone 15 MG tablet    BUSPAR    90 tablet    Take 1 tablet (15 mg) by mouth 3 times daily    Generalized anxiety disorder       FLUoxetine 20 MG capsule    PROzac    30 capsule    Take 1 capsule (20 mg) by mouth daily    Major depressive disorder, single episode, severe (H)       hydrOXYzine 50 MG tablet    ATARAX    120 tablet    Take 1 tablet (50 mg) by mouth every 4 hours as needed for anxiety    Generalized anxiety disorder       * lithium 300 MG CR tablet    ESKALITH/LITHOBID    60 tablet    Take 2 tablets (600 mg) by mouth At Bedtime    Bipolar affective disorder, currently depressed, moderate (H)       * lithium 300 MG CR tablet    ESKALITH/LITHOBID    30 tablet    Take 1 tablet (300 mg) by mouth daily    Bipolar affective disorder, currently depressed, moderate (H)       * risperiDONE 2 MG  tablet    risperDAL    30 tablet    Take 1 tablet (2 mg) by mouth At Bedtime    Bipolar affective disorder, currently depressed, moderate (H)       * risperiDONE 1 MG tablet    risperDAL    30 tablet    Take 1 tablet (1 mg) by mouth daily    Bipolar affective disorder, currently depressed, moderate (H)       traZODone 50 MG tablet    DESYREL    30 tablet    Take 1 tablet (50 mg) by mouth nightly as needed for sleep    Insomnia due to other mental disorder       * Notice:  This list has 4 medication(s) that are the same as other medications prescribed for you. Read the directions carefully, and ask your doctor or other care provider to review them with you.

## 2018-10-27 LAB — T PALLIDUM AB SER QL: NONREACTIVE

## 2018-10-27 ASSESSMENT — ANXIETY QUESTIONNAIRES: GAD7 TOTAL SCORE: 7

## 2018-10-28 LAB
C TRACH DNA SPEC QL NAA+PROBE: NEGATIVE
N GONORRHOEA DNA SPEC QL NAA+PROBE: NEGATIVE
SPECIMEN SOURCE: NORMAL
SPECIMEN SOURCE: NORMAL

## 2018-10-29 ENCOUNTER — TELEPHONE (OUTPATIENT)
Dept: OBGYN | Facility: CLINIC | Age: 23
End: 2018-10-29

## 2018-10-29 ENCOUNTER — RADIANT APPOINTMENT (OUTPATIENT)
Dept: ULTRASOUND IMAGING | Facility: CLINIC | Age: 23
End: 2018-10-29
Attending: OBSTETRICS & GYNECOLOGY
Payer: COMMERCIAL

## 2018-10-29 DIAGNOSIS — N91.1 SECONDARY AMENORRHEA: ICD-10-CM

## 2018-10-29 DIAGNOSIS — R10.2 PELVIC PAIN IN FEMALE: ICD-10-CM

## 2018-10-29 LAB
HBV SURFACE AG SERPL QL IA: NONREACTIVE
HIV 1+2 AB+HIV1 P24 AG SERPL QL IA: NONREACTIVE

## 2018-10-29 PROCEDURE — 76856 US EXAM PELVIC COMPLETE: CPT

## 2018-10-29 PROCEDURE — 76830 TRANSVAGINAL US NON-OB: CPT

## 2018-10-29 NOTE — LETTER
[address] [address]  Phone: [phone]    10/30/18    Raissa Ruth  [address] [facility] [address]    Raissa,    I attempted to contact you both yesterday and today. I called on your cell phone [phone] and the recording said that you do not have voicemail set up.    When you receive this letter, will you please call the clinic at [phone] and ask to speak to a registered nurse. Dr. Eduardo wants you to know the results of your lab tests and ultrasound. The nurses are in clinic Monday through Friday between 8 am and 5 pm.    Thank you!        АЛЕКСАНДР Martínez BAN

## 2018-10-30 LAB
SHBG SERPL-SCNC: 47 NMOL/L (ref 30–135)
TESTOST FREE SERPL-MCNC: 0.45 NG/DL (ref 0.08–0.74)
TESTOST SERPL-MCNC: 32 NG/DL (ref 8–60)

## 2018-10-30 NOTE — TELEPHONE ENCOUNTER
Notes Recorded by La Eduardo DO on 10/30/2018 at 1:58 PM  Please tell her that her testosterone values were normal.  ------    Notes Recorded by La Eduardo DO on 10/29/2018 at 5:42 PM  Please call this patient and let her know that her thyroid test was abnormal so she needs to make an appointment with Family Practice for follow up.  This could be the cause of her lack of a menses    Attempted to contact patient and voicemail is not set up.   Letter sent with nl letter sent by Beverly PAULA CMA. Request for patient to call clinic for patient.   Tanya Bran RN, BAN

## 2018-10-31 LAB
COPATH REPORT: NORMAL
PAP: NORMAL

## 2018-11-05 ENCOUNTER — TELEPHONE (OUTPATIENT)
Dept: OBGYN | Facility: CLINIC | Age: 23
End: 2018-11-05

## 2018-11-05 NOTE — TELEPHONE ENCOUNTER
Received phone call from pt.  Relayed Dr. Eduardo  Message pertaining to ultrasound and labs.  Pt reports had period without Provera.  Advised pt to start medication next month if needed to trigger menses. Pt agrees to schedule appointment with FP for Thyroid issue.  Bennett Galdamez RN on 11/5/2018 at 10:41 AM

## 2019-03-19 ENCOUNTER — TELEPHONE (OUTPATIENT)
Dept: ENDOCRINOLOGY | Facility: CLINIC | Age: 24
End: 2019-03-19

## 2019-03-19 NOTE — TELEPHONE ENCOUNTER
Reason for Call:  Other call back    Detailed comments: Patient is new to endocrinology and I have an appointment scheduled for her in May. However, she currently does not have a referral. Does she need a referral before she can be seen? If she does, will you please follow up with her to let her know? Thank you.    Phone Number Patient can be reached at: Cell number on file:    Telephone Information:   Mobile [phone]       Best Time: Anytime.    Can we leave a detailed message on this number? YES    Call taken on 3/19/2019 at 5:07 PM by Edmond Wood

## 2019-03-21 ENCOUNTER — TELEPHONE (OUTPATIENT)
Dept: OBGYN | Facility: CLINIC | Age: 24
End: 2019-03-21

## 2019-03-21 NOTE — TELEPHONE ENCOUNTER
[facility] Call Center    Phone Message  Patient is asking about her endo referral. Please send to BELLO Tavera -   Please call patient when this has been placed.     May a detailed message be left on voicemail: yes    Reason for Call: Other: Patient is asking about her endo referral. Please send to BELLO Tavera -      Action Taken: Message routed to:  [facility] p [phone]

## 2019-03-21 NOTE — TELEPHONE ENCOUNTER
Call to patient, informed that a referral would be needed to be seen by endocrinology and that she would request this from her primary care provider

## 2019-03-21 NOTE — TELEPHONE ENCOUNTER
Patient is requesting a referral to see endocrine. Routing to provider to review and advise.      Jaz Jenkins CMA  March 21, 2019  8:53 AM

## 2019-03-22 NOTE — TELEPHONE ENCOUNTER
Spoke with PT and PT thought that the provider told her to see Endo, but I informed her that it was FP so PT will call and make an appointment with FP.  Beverly Andrade, CMA

## 2019-03-22 NOTE — TELEPHONE ENCOUNTER
Why is this patient needing an Endocrine referral?  I had asked her to f/u with FP for an abnormally low TSH level.

## 2019-03-25 ENCOUNTER — OFFICE VISIT (OUTPATIENT)
Dept: PSYCHOLOGY | Facility: CLINIC | Age: 24
End: 2019-03-25
Payer: COMMERCIAL

## 2019-03-25 ENCOUNTER — OFFICE VISIT (OUTPATIENT)
Dept: PEDIATRICS | Facility: CLINIC | Age: 24
End: 2019-03-25
Payer: COMMERCIAL

## 2019-03-25 VITALS
HEIGHT: 67 IN | TEMPERATURE: 98.1 F | SYSTOLIC BLOOD PRESSURE: 120 MMHG | DIASTOLIC BLOOD PRESSURE: 70 MMHG | WEIGHT: 145.7 LBS | BODY MASS INDEX: 22.87 KG/M2 | HEART RATE: 93 BPM | OXYGEN SATURATION: 98 %

## 2019-03-25 DIAGNOSIS — F60.3 BORDERLINE PERSONALITY DISORDER (H): ICD-10-CM

## 2019-03-25 DIAGNOSIS — F31.60 BIPOLAR AFFECTIVE DISORDER, CURRENT EPISODE MIXED, CURRENT EPISODE SEVERITY UNSPECIFIED (H): ICD-10-CM

## 2019-03-25 DIAGNOSIS — R79.89 ABNORMAL TSH: Primary | ICD-10-CM

## 2019-03-25 DIAGNOSIS — F41.1 GENERALIZED ANXIETY DISORDER: Primary | ICD-10-CM

## 2019-03-25 PROBLEM — F31.9 BIPOLAR AFFECTIVE DISORDER, CURRENTLY ACTIVE (H): Status: ACTIVE | Noted: 2019-03-25

## 2019-03-25 LAB
T3 SERPL-MCNC: 109 NG/DL (ref 60–181)
TSH SERPL DL<=0.005 MIU/L-ACNC: 0.73 MU/L (ref 0.4–4)

## 2019-03-25 PROCEDURE — 99214 OFFICE O/P EST MOD 30 MIN: CPT | Performed by: FAMILY MEDICINE

## 2019-03-25 PROCEDURE — 84480 ASSAY TRIIODOTHYRONINE (T3): CPT | Performed by: FAMILY MEDICINE

## 2019-03-25 PROCEDURE — 84443 ASSAY THYROID STIM HORMONE: CPT | Performed by: FAMILY MEDICINE

## 2019-03-25 PROCEDURE — 36415 COLL VENOUS BLD VENIPUNCTURE: CPT | Performed by: FAMILY MEDICINE

## 2019-03-25 ASSESSMENT — MIFFLIN-ST. JEOR: SCORE: 1453.52

## 2019-03-25 NOTE — PROGRESS NOTES
SUBJECTIVE:   Raissa Ruth is a 23 year old female who presents to clinic today for the following health issues:    Was informed by OB/GYN 6 months ago to follow-up on thyroid labs. Patient has an abnormal TSH but normal T4 but was advised to have a follow up with primary care. She currently denies symptoms of hair loss, weight or skin changes or GI disturbances. She does have a history of  bipolar, polysubstance use, and borderline personality disorder but self discontinued her medications due to it been not helpful. While connected with a therapist she doesn't have a psychiatrist for med management at this time. She does complain of more anxiety and depressive thoughts and has has suicidal ideations without an attempt before or plan. Her last inpatient treatment was a couple of years ago and she does state she has very good coping skills. Verified no suicidal thoughts or ideation at the time of my examination. She would like to have her TSH retested as she believes her 'abnormal number may be messing up with her mental health'.      Problem list and histories reviewed & adjusted, as indicated.  Additional history: as documented    Patient Active Problem List   Diagnosis     Attention deficit disorder of childhood     Generalized anxiety disorder     Major depressive disorder, single episode, severe (H)     Deliberate self-cutting     Birth control     Irregular menstrual cycle     Splenomegaly     Ovarian cyst     Anemia due to blood loss, acute     Acute posthemorrhagic anemia     Thrombocytopenia (H)     Suicidal behavior     Mental health problem     Suicidal ideation     Bipolar affective disorder, currently active (H)     Past Surgical History:   Procedure Laterality Date     APPENDECTOMY  2011     HC TOOTH EXTRACTION W/FORCEP         Social History     Tobacco Use     Smoking status: Current Every Day Smoker     Packs/day: 0.50     Types: Cigarettes     Smokeless tobacco: Never Used   Substance Use Topics  "    Alcohol use: Yes     Alcohol/week: 0.0 oz     Comment: socially     Family History   Problem Relation Age of Onset     Breast Cancer Maternal Grandmother      Breast Cancer Paternal Grandmother      Depression Mother          Current Outpatient Medications   Medication Sig Dispense Refill     lithium (ESKALITH/LITHOBID) 300 MG CR tablet Take 2 tablets (600 mg) by mouth At Bedtime (Patient not taking: Reported on 10/26/2018) 60 tablet 1     lithium (ESKALITH/LITHOBID) 300 MG CR tablet Take 1 tablet (300 mg) by mouth daily (Patient not taking: Reported on 10/26/2018) 30 tablet 1     medroxyPROGESTERone (PROVERA) 10 MG tablet Take 1 tablet (10 mg) by mouth daily x 10 days each month to trigger a menses (Patient not taking: Reported on 3/25/2019) 30 tablet 3     risperiDONE (RISPERDAL) 1 MG tablet Take 1 tablet (1 mg) by mouth daily (Patient not taking: Reported on 10/26/2018) 30 tablet 1     risperiDONE (RISPERDAL) 2 MG tablet Take 1 tablet (2 mg) by mouth At Bedtime (Patient not taking: Reported on 10/26/2018) 30 tablet 1     traZODone (DESYREL) 50 MG tablet Take 1 tablet (50 mg) by mouth nightly as needed for sleep (Patient not taking: Reported on 10/26/2018) 30 tablet 1     No Known Allergies    Reviewed and updated as needed this visit by clinical staff  Tobacco  Allergies  Meds  Med Hx  Surg Hx  Fam Hx  Soc Hx      Reviewed and updated as needed this visit by Provider  Med Hx  Surg Hx  Fam Hx         ROS:  Constitutional, HEENT, cardiovascular, pulmonary, GI, , musculoskeletal, neuro, skin, endocrine and psych systems are negative, except as otherwise noted.    OBJECTIVE:     /70 (BP Location: Right arm, Patient Position: Sitting, Cuff Size: Adult Regular)   Pulse 93   Temp 98.1  F (36.7  C) (Temporal)   Ht 1.702 m (5' 7\")   Wt 66.1 kg (145 lb 11.2 oz)   LMP  (LMP Unknown)   SpO2 98%   Breastfeeding? No   BMI 22.82 kg/m    Body mass index is 22.82 kg/m .   GENERAL: healthy, alert and " no distress  EYES: Eyes grossly normal to inspection, PERRL and conjunctivae and sclerae normal  HENT: ear canals and TM's normal, nose and mouth without ulcers or lesions  NECK: no adenopathy, no asymmetry, masses, or scars and thyroid normal to palpation  RESP: lungs clear to auscultation - no rales, rhonchi or wheezes  CV: regular rate and rhythm, normal S1 S2, no S3 or S4, no murmur, click or rub, no peripheral edema and peripheral pulses strong  ABDOMEN: soft, nontender, no hepatosplenomegaly, no masses and bowel sounds normal  MS: no gross musculoskeletal defects noted, no edema  SKIN: no suspicious lesions or rashes  NEURO: Normal strength and tone, mentation intact and speech normal  PSYCH: mentation appears normal, affect normal/bright    Results for orders placed or performed in visit on 03/25/19   TSH with free T4 reflex   Result Value Ref Range    TSH 0.73 0.40 - 4.00 mU/L   T3, total   Result Value Ref Range    Triiodothyronine (T3) 109 60 - 181 ng/dL         ASSESSMENT:   Raissa was seen today for thyroid problem.    Diagnoses and all orders for this visit:    Abnormal TSH  -     TSH with free T4 reflex  -     T3, total    Bipolar affective disorder, current episode mixed, current episode severity unspecified (H)    Borderline personality Disorder      PLAN:   I reviewed her TSH was normal so no reflex T4 was done, her Total T3 levels were also within normal limits. I reassured patient her thyroid gland seemed to be functioning appropriately and was in no way worsening her mental health. I advised patient to continue with her DBT sessions but to establish with a psychiatrist for discussions about starting back on mood stabilizers. I also consulted with [facility] therapist Luann Garcia who visited with patient, appreciate her input and consult.    Regular exercise  See Patient Instructions    Meche Pierre MD  [facility]

## 2019-03-25 NOTE — NURSING NOTE
"Chief Complaint   Patient presents with     Thyroid Problem     patient was informed 6 months ago to see primary care for her thyroid       Initial /70 (BP Location: Right arm, Patient Position: Sitting, Cuff Size: Adult Regular)   Pulse 93   Temp 98.1  F (36.7  C) (Temporal)   Ht 1.702 m (5' 7\")   Wt 66.1 kg (145 lb 11.2 oz)   LMP  (LMP Unknown)   SpO2 98%   Breastfeeding? No   BMI 22.82 kg/m   Estimated body mass index is 22.82 kg/m  as calculated from the following:    Height as of this encounter: 1.702 m (5' 7\").    Weight as of this encounter: 66.1 kg (145 lb 11.2 oz).  Medication Reconciliation: complete      LORI Padilla      "

## 2019-03-26 ASSESSMENT — ANXIETY QUESTIONNAIRES
GAD7 TOTAL SCORE: 15
1. FEELING NERVOUS, ANXIOUS, OR ON EDGE: NEARLY EVERY DAY
3. WORRYING TOO MUCH ABOUT DIFFERENT THINGS: NEARLY EVERY DAY
7. FEELING AFRAID AS IF SOMETHING AWFUL MIGHT HAPPEN: SEVERAL DAYS
2. NOT BEING ABLE TO STOP OR CONTROL WORRYING: NEARLY EVERY DAY
6. BECOMING EASILY ANNOYED OR IRRITABLE: NEARLY EVERY DAY
5. BEING SO RESTLESS THAT IT IS HARD TO SIT STILL: NOT AT ALL

## 2019-03-26 ASSESSMENT — PATIENT HEALTH QUESTIONNAIRE - PHQ9
5. POOR APPETITE OR OVEREATING: MORE THAN HALF THE DAYS
SUM OF ALL RESPONSES TO PHQ QUESTIONS 1-9: 22

## 2019-03-26 NOTE — PROGRESS NOTES
"Patient had an appointment with her primary care physician. [facility] services were offered. Patient has an prior mental health history, with previous diagnoses of bipolar, polysubstance use, and borderline personality disorder. Patient stated that she currently has a therapist at [facility], and sees the therapist one time per week. She stated that she has a good therapeutic relationship with her therapist. Patient stated that she may need more structure in her therapy, and expressed interest in DBT. Patient stated that she a resource list from her therapist, and needs to focus on making the phone calls. Patient reported history of receiving medication management from a provider in [facility], but stated that she decided to discontinue medication on her own.  She stated that she does not want to be on medication due to weight gain, skin problems, and not feeling like they were helpful. Patient at the same time expressed concern about worsening anxiety.  Patient was guided to begin to think about potential gains of meeting with a new psychiatrist. Patient stated that she would consider it.  [facility] provided best practice recommendations. No immediate safety/risk issues were reported or identified. Patient denied any SI. She reported occasional \"passing\" thoughts, but denied any plan or intent. She stated that she would be \"too scared\" to kill herself, and reported that she has pets to live for. Patient denied any self-harm in one year.  Explained the role of the [facility] and provided contact information for the [facility]. Patient agreed to reach out to [facility] in the future if behavioral health needs arise.    Emmy Garcia, Behavioral Health Clinician    "

## 2019-03-27 ASSESSMENT — ANXIETY QUESTIONNAIRES: GAD7 TOTAL SCORE: 15

## 2019-10-04 ENCOUNTER — OFFICE VISIT (OUTPATIENT)
Dept: OBGYN | Facility: CLINIC | Age: 24
End: 2019-10-04
Payer: COMMERCIAL

## 2019-10-04 VITALS
DIASTOLIC BLOOD PRESSURE: 73 MMHG | HEART RATE: 80 BPM | BODY MASS INDEX: 22.65 KG/M2 | SYSTOLIC BLOOD PRESSURE: 114 MMHG | WEIGHT: 144.6 LBS

## 2019-10-04 DIAGNOSIS — F32.81 PREMENSTRUAL DYSPHORIC DISORDER: Primary | ICD-10-CM

## 2019-10-04 PROBLEM — F31.9 BIPOLAR AFFECTIVE DISORDER, CURRENTLY ACTIVE (H): Status: RESOLVED | Noted: 2019-03-25 | Resolved: 2019-10-04

## 2019-10-04 PROCEDURE — 99214 OFFICE O/P EST MOD 30 MIN: CPT | Performed by: OBSTETRICS & GYNECOLOGY

## 2019-10-04 NOTE — NURSING NOTE
"Chief Complaint   Patient presents with     Consult     Contraception       Initial /73 (BP Location: Right arm, Cuff Size: Adult Regular)   Pulse 80   Wt 65.6 kg (144 lb 9.6 oz)   LMP 10/04/2019   BMI 22.65 kg/m   Estimated body mass index is 22.65 kg/m  as calculated from the following:    Height as of 3/25/19: 1.702 m (5' 7\").    Weight as of this encounter: 65.6 kg (144 lb 9.6 oz).  BP completed using cuff size: regular        The following HM Due: NONE      The following patient reported/Care Every where data was sent to:  P ABSTRACT QUALITY INITIATIVES [[id number]]          Britany Poe MA on 10/4/2019 at 2:07 PM        "

## 2019-10-04 NOTE — PROGRESS NOTES
OB/GYN       NAME:  Raissa Ruth  PCP:  Clinic, [facility]  MRN:  [id number]      Impression / Plan     24 year old  with:      ICD-10-CM    1. Premenstrual dysphoric disorder F32.81      Patient will try conservative therapy.  Patient will call in if patient would like a prescription.  Recommend nuvaring in a continuous fashion if patient elects to try medical management.    She will continue with her DBT sessions and her therapist.  She is not interested in psychiatric medications at this time.   Note that patient is nonbinary and prefers they/them pronouns.      Chief Complaint     Chief Complaint   Patient presents with     Consult     Contraception       HPI     Raissa Ruth is a  24 year old female (nonbianary) who is seen for contraception.  Patient would like it for management of mood around the time of the cycle, however contraception has not been well tolerated in the past.     Has suicidal ideation during the time of period and that is the main reason for birth control.  She denies suicidal ideation at this time. States it comes and goes.     [facility]  Patient tried it for two weeks, but then stopped it because of weight gain.  Body image issues contribute to mental health.      TSH was slightly low last year but normal in 2019    Patient has been told patient has PCOS - oligomenorrhea and polycystic appearing ovaries on US.      Not currently sexually active.     Tried depo provera in the past, but did not like it due to lack of menses.  Patient not sure how it effect mental health.        Patient's last menstrual period was 10/04/2019.  Periods were every 6-9 months until about a year ago, now every 1.5-2 months.  Last 5 days.  Flow varies.  Changes a tampon every 4 hours when it is heavy.        Date of Last Pap Smear:   Lab Results   Component Value Date    PAP NIL 10/26/2018       Problem List     Problem  list adjusted with patient input    Patient Active Problem List    Diagnosis Date Noted     Suicidal ideation 02/06/2018     Priority: Medium     Suicidal behavior 11/04/2015     Priority: Medium     Irregular menstrual cycle 07/06/2012     Priority: Medium     Major depressive disorder, single episode, severe (H) 03/16/2012     Priority: Medium     Generalized anxiety disorder 10/10/2011     Priority: Medium       Medications     No current outpatient medications on file.     No current facility-administered medications for this visit.         Allergies     No Known Allergies    Past Medical/Surgical History     Past Medical History:   Diagnosis Date     Anxiety      Bipolar 1 disorder (H)      Depression      History of Deliberate self-cutting      Splenomegaly 02/07/2015    when she had mono     Substance abuse (H)        Past Surgical History:   Procedure Laterality Date     APPENDECTOMY  2011     HC TOOTH EXTRACTION W/FORCEP          Social History     Social History     Socioeconomic History     Marital status: Single     Spouse name: Not on file     Number of children: Not on file     Years of education: Not on file     Highest education level: Not on file   Occupational History     Not on file   Social Needs     Financial resource strain: Not on file     Food insecurity:     Worry: Not on file     Inability: Not on file     Transportation needs:     Medical: Not on file     Non-medical: Not on file   Tobacco Use     Smoking status: Former Smoker     Packs/day: 0.50     Types: Cigarettes     Smokeless tobacco: Never Used   Substance and Sexual Activity     Alcohol use: Yes     Alcohol/week: 0.0 standard drinks     Comment: socially     Drug use: Yes     Types: Marijuana     Sexual activity: Not Currently     Partners: Female, Male   Lifestyle     Physical activity:     Days per week: Not on file     Minutes per session: Not on file     Stress: Not on file   Relationships     Social connections:     Talks on  phone: Not on file     Gets together: Not on file     Attends Caodaism service: Not on file     Active member of club or organization: Not on file     Attends meetings of clubs or organizations: Not on file     Relationship status: Not on file     Intimate partner violence:     Fear of current or ex partner: Not on file     Emotionally abused: Not on file     Physically abused: Not on file     Forced sexual activity: Not on file   Other Topics Concern     Parent/sibling w/ CABG, MI or angioplasty before 65F 55M? Not Asked   Social History Narrative     Not on file       Family History      Family History   Problem Relation Age of Onset     Breast Cancer Maternal Grandmother      Breast Cancer Paternal Grandmother      Depression Mother        ROS     A 10 organ review of systems was asked and the pertinent positives and negatives are listed in the HPI. All other organ systems can be considered negative.     Physical Exam   Vitals: /73 (BP Location: Right arm, Cuff Size: Adult Regular)   Pulse 80   Wt 65.6 kg (144 lb 9.6 oz)   LMP 10/04/2019   BMI 22.65 kg/m      General: Comfortable, no obvious distress, normal body habitus  HEENT: Sclera clear.  Trachea midline.   Resp: breathing is non labored  Skin: No rashes, lesions, or subcutaneous nodules.   Psych: Alert. Appropriate affect,.  Normal speech.   : deferred  Extremities: No peripheral edema.  Gait steady       Labs/Imaging       Labs were reviewed in Epic       25 minutes was spent with patient, more than 50% counseling and coordinating care, discussing concerns as noted above    Nursing notes read and reviewed    Britany Beach MD

## 2020-04-06 ENCOUNTER — VIRTUAL VISIT (OUTPATIENT)
Dept: OBGYN | Facility: CLINIC | Age: 25
End: 2020-04-06
Payer: COMMERCIAL

## 2020-04-06 DIAGNOSIS — N89.8 VAGINAL PRURITUS: ICD-10-CM

## 2020-04-06 DIAGNOSIS — Z11.3 SCREEN FOR STD (SEXUALLY TRANSMITTED DISEASE): Primary | ICD-10-CM

## 2020-04-06 PROCEDURE — 99213 OFFICE O/P EST LOW 20 MIN: CPT | Mod: TEL | Performed by: NURSE PRACTITIONER

## 2020-04-06 RX ORDER — BUPROPION HYDROCHLORIDE 150 MG/1
150 TABLET ORAL
COMMUNITY
Start: 2020-02-20 | End: 2020-12-17

## 2020-04-06 RX ORDER — FLUCONAZOLE 150 MG/1
150 TABLET ORAL ONCE
Qty: 1 TABLET | Refills: 0 | Status: SHIPPED | OUTPATIENT
Start: 2020-04-06 | End: 2020-04-06

## 2020-04-06 RX ORDER — RISPERIDONE 0.25 MG/1
0.25 TABLET ORAL
COMMUNITY
Start: 2020-03-12 | End: 2020-12-17

## 2020-04-06 RX ORDER — LAMOTRIGINE 100 MG/1
50 TABLET ORAL
COMMUNITY
Start: 2020-02-06 | End: 2021-10-04

## 2020-04-06 NOTE — PROGRESS NOTES
"Raissa Ruth is a 24 year old female who is being evaluated via a billable telephone visit.      The patient has been notified of following:     \"This telephone visit will be conducted via a call between you and your physician/provider. We have found that certain health care needs can be provided without the need for a physical exam.  This service lets us provide the care you need with a short phone conversation.  If a prescription is necessary we can send it directly to your pharmacy.  If lab work is needed we can place an order for that and you can then stop by our lab to have the test done at a later time.    If during the course of the call the physician/provider feels a telephone visit is not appropriate, you will not be charged for this service.\"     Patient has given verbal consent for Telephone visit?  Yes    aRissa Ruth complains of    Chief Complaint   Patient presents with     Vaginal Problem       I have reviewed and updated the patient's Past Medical History, Social History, Family History and Medication List.    ALLERGIES  Patient has no known allergies.    Additional provider notes:    Subjective     Raissa Ruth is a 24 year old female who requested a phone visit for the following health issues:    HPI   Vaginal Symptoms  Onset: Few days    Description:  Vaginal Discharge: minimal increase   Itching (Pruritis): YES  Burning sensation:  YES  Odor: no     Accompanying Signs & Symptoms:  Pain with Urination: no   Abdominal Pain: no   Fever: no     History:   Sexually active: YES  New Partner: no   Possibility of Pregnancy:  No    Precipitating factors:   Recent Antibiotic Use: no     Alleviating factors:  Sitz bath    Therapies Tried and outcome: Sitz bath    Patient had similar symptoms about a month ago, self treated with OTC Monistat x 1 day and symptoms resolved. She denies any abnormal urinary symptoms, pelvic pain, nausea, fever. Same partner, but she is requested orders to do lab visit in the " near future for STI screening-no specific concern, would like reassurance.  No new products.     Patient Active Problem List   Diagnosis     Generalized anxiety disorder     Major depressive disorder, single episode, severe (H)     Irregular menstrual cycle     Suicidal behavior     Suicidal ideation     Past Surgical History:   Procedure Laterality Date     APPENDECTOMY  2011     HC TOOTH EXTRACTION W/FORCEP         Social History     Tobacco Use     Smoking status: Former Smoker     Types: Cigarettes     Smokeless tobacco: Never Used   Substance Use Topics     Alcohol use: Yes     Alcohol/week: 0.0 standard drinks     Comment: socially     Family History   Problem Relation Age of Onset     Breast Cancer Maternal Grandmother      Breast Cancer Paternal Grandmother      Depression Mother          Reviewed and updated as needed this visit by Provider  Tobacco  Allergies  Meds  Problems  Med Hx  Surg Hx  Fam Hx  Soc Hx          Review of Systems   ROS COMP: Constitutional, HEENT, cardiovascular, pulmonary, gi and gu systems are negative, except as otherwise noted.      Assessment & Plan     1. Screen for STD (sexually transmitted disease)  Patient will wait few weeks as she prefers not to be in clinic in the near future, will then schedule lab only appointment at her convenience.   - Chlamydia trachomatis PCR; Future  - Neisseria gonorrhoeae PCR; Future  - Hepatitis C antibody; Future  - Hepatitis B surface antigen; Future  - Treponema Abs w Reflex to RPR and Titer; Future  - HIV Antigen Antibody Combo; Future    2. Vaginal pruritus  Discussed her symptoms and options for management, will send prescription for Diflucan, also discussed use of OTC Hydrocortisone sparingly twice daily for 5 days to see if this helps further reduce the burning. If symptoms persist or worsen, to call. We discussed additional home care measures she can try.  - fluconazole (DIFLUCAN) 150 MG tablet; Take 1 tablet (150 mg) by mouth once  for 1 dose  Dispense: 1 tablet; Refill: 0     Phone call duration: 11 minutes    HORTENCIA Segundo CNP

## 2020-12-06 ENCOUNTER — HEALTH MAINTENANCE LETTER (OUTPATIENT)
Age: 25
End: 2020-12-06

## 2020-12-17 ENCOUNTER — OFFICE VISIT (OUTPATIENT)
Dept: OBGYN | Facility: OTHER | Age: 25
End: 2020-12-17
Payer: COMMERCIAL

## 2020-12-17 VITALS
BODY MASS INDEX: 21.85 KG/M2 | WEIGHT: 139.5 LBS | OXYGEN SATURATION: 100 % | SYSTOLIC BLOOD PRESSURE: 104 MMHG | DIASTOLIC BLOOD PRESSURE: 62 MMHG | HEART RATE: 74 BPM

## 2020-12-17 DIAGNOSIS — F32.81 PREMENSTRUAL DYSPHORIC DISORDER: ICD-10-CM

## 2020-12-17 DIAGNOSIS — N94.89 UTERINE CRAMPING: Primary | ICD-10-CM

## 2020-12-17 PROCEDURE — 99214 OFFICE O/P EST MOD 30 MIN: CPT | Performed by: OBSTETRICS & GYNECOLOGY

## 2020-12-17 RX ORDER — BUSPIRONE HYDROCHLORIDE 7.5 MG/1
7.5 TABLET ORAL
COMMUNITY
Start: 2020-11-23 | End: 2021-10-04

## 2020-12-17 NOTE — PROGRESS NOTES
OB/GYN      NAME:  Raissa Ruth  PCP:  Clinic, [facility]  MRN:  [id number]    Impression / Plan     25 year old  with:      ICD-10-CM    1. Uterine cramping  N94.89 US Pelvic Complete with Transvaginal       US ordered so it is there if she gets pelvic cramping again.   She will continue dietary changes and consider acupuncture for management of the PMDD.    We also had a discussion regarding HPV and she was encouraged to review the [facility] website.    Chief Complaint     Chief Complaint   Patient presents with     Pelvic Pain     Consult       HPI     Raissa Ruth is a  25 year old female (nonbianary) who is seen for follow up.    Patient states that they were started on the patch at [facility] for PMDD.  Used it for about week and stopped.  Constant cramping for a month after, which prompted this appointment.     Feeling better now.      Patient's last menstrual period was 2020 (approximate).   Cycles are 47 days now. Cramping with the last period, felt really tight, like someone was pulling, more persistent.  Didn't really hurt, but was really uncomfortable.      Lamictal and buspar started since I last saw the patient and this has not helped the PMDD.  Please see my last note.    PMDD depends on diet.  Limiting sugar helps.      Questions about HPV.  Recently read that about 80% of people are exposed to HPV in their lifetime and she is very anxious about this.  She is wondering if there is anything she can do or any test she can have.    Date of Last Pap Smear:   Lab Results   Component Value Date    PAP NIL 10/26/2018         Problem List     Patient Active Problem List    Diagnosis Date Noted     Suicidal ideation 2018     Priority: Medium     Suicidal behavior 2015     Priority: Medium     Irregular menstrual cycle 2012     Priority: Medium     Major depressive disorder, single episode, severe (H) 2012     Priority: Medium     Generalized anxiety  disorder 10/10/2011     Priority: Medium       Medications     Current Outpatient Medications   Medication     busPIRone (BUSPAR) 7.5 MG tablet     lamoTRIgine (LAMICTAL) 100 MG tablet     No current facility-administered medications for this visit.         Allergies     No Known Allergies    ROS     A /GI organ review of systems was asked and the pertinent positives and negatives are listed in the HPI.    Physical Exam   Vitals: /62 (BP Location: Right arm, Cuff Size: Adult Regular)   Pulse 74   Wt 63.3 kg (139 lb 8 oz)   LMP 11/05/2020 (Approximate)   SpO2 100%   BMI 21.85 kg/m      General: Comfortable, no obvious distress, normal  body habitus  Psych: Alert and orientated x 3. Appropriate affect, good insight.   : deferred    Labs/Imaging       Labs were reviewed in [facility]     Imaging was reviewed in Epic.       25 minutes was spent with patient, more than 50% counseling and coordinating care as noted above in the A/P    Nursing notes read and reviewed    Britany Beach MD

## 2021-01-20 ENCOUNTER — NURSE TRIAGE (OUTPATIENT)
Dept: NURSING | Facility: CLINIC | Age: 26
End: 2021-01-20

## 2021-01-20 NOTE — TELEPHONE ENCOUNTER
"Having chest pain \"kind of' and has anxiety and panic disorder.    Chest pain lasts under left side of sternum, hurts to touch for past few days.    Rates chest pain as zero but can get as high as 8.  Touching it makes it worse \"aching\" sensitive.  Has been happening few days. \" \"I am an anxious person.    She has an appointment with her provider scheduled.    She is going to talk to them about adding a med for anxiety? I talked to her about new gene testing for antidepressants that she can talk to her provider about.  See what they feel about the testing?  Patient feels stuck and does not wants sexual side effects of ssri's.      Wanda Claros RN  [facility] Home Nurse Advisor    "

## 2021-01-20 NOTE — TELEPHONE ENCOUNTER
Additional Information    Negative: Severe difficulty breathing (e.g., struggling for each breath, speaks in single words)    Negative: Bluish (or gray) lips or face now    Negative: Difficult to awaken or acting confused (e.g., disoriented, slurred speech)    Negative: Hysterical or combative behavior    Negative: Sounds like a life-threatening emergency to the triager    Negative: Chest pain    Negative: Palpitations, skipped heart beat, or rapid heart beat    Negative: Cough is main symptom    Negative: Suicide thoughts, threats, attempts, or questions    Negative: Depression is main problem or symptom (e.g., feelings of sadness or hopelessness)    Negative: [1] Difficulty breathing AND [2] persists > 10 minutes AND [3] not relieved by reassurance provided by triager    Negative: [1] Lightheadedness or dizziness AND [2] persists > 10 minutes AND [3] not relieved by reassurance provided by triager    Negative: [1] Alcohol or drug abuse, known or suspected AND [2] feeling very shaky (i.e., visible tremors of hands)    Negative: Patient sounds very sick or weak to the triager    Negative: Symptoms interfere with work or school    Negative: Requesting to talk to a counselor (e.g., mental health worker, psychiatrist)    Negative: Patient sounds very upset or troubled to the triager    [1] Symptoms of anxiety or panic AND [2] has not been evaluated for this by physician    Protocols used: ANXIETY AND PANIC ATTACK-A-

## 2021-01-22 ENCOUNTER — OFFICE VISIT (OUTPATIENT)
Dept: FAMILY MEDICINE | Facility: CLINIC | Age: 26
End: 2021-01-22
Payer: COMMERCIAL

## 2021-01-22 VITALS
BODY MASS INDEX: 20.99 KG/M2 | WEIGHT: 134 LBS | DIASTOLIC BLOOD PRESSURE: 68 MMHG | OXYGEN SATURATION: 100 % | TEMPERATURE: 98.3 F | RESPIRATION RATE: 14 BRPM | SYSTOLIC BLOOD PRESSURE: 122 MMHG | HEART RATE: 85 BPM

## 2021-01-22 DIAGNOSIS — F12.21 CANNABIS DEPENDENCE, IN REMISSION (H): ICD-10-CM

## 2021-01-22 DIAGNOSIS — M79.644 THUMB PAIN, RIGHT: ICD-10-CM

## 2021-01-22 DIAGNOSIS — E87.6 HYPOKALEMIA: ICD-10-CM

## 2021-01-22 DIAGNOSIS — F31.60 BIPOLAR AFFECTIVE DISORDER, CURRENT EPISODE MIXED, CURRENT EPISODE SEVERITY UNSPECIFIED (H): ICD-10-CM

## 2021-01-22 DIAGNOSIS — M67.40 GANGLION CYST: ICD-10-CM

## 2021-01-22 DIAGNOSIS — R07.89 ATYPICAL CHEST PAIN: Primary | ICD-10-CM

## 2021-01-22 DIAGNOSIS — H90.0 CONDUCTIVE HEARING LOSS, BILATERAL: ICD-10-CM

## 2021-01-22 LAB
ALBUMIN SERPL-MCNC: 4.2 G/DL (ref 3.4–5)
ALP SERPL-CCNC: 74 U/L (ref 40–150)
ALT SERPL W P-5'-P-CCNC: 21 U/L (ref 0–50)
ANION GAP SERPL CALCULATED.3IONS-SCNC: 5 MMOL/L (ref 3–14)
AST SERPL W P-5'-P-CCNC: 11 U/L (ref 0–45)
BASOPHILS # BLD AUTO: 0 10E9/L (ref 0–0.2)
BASOPHILS NFR BLD AUTO: 0.3 %
BILIRUB SERPL-MCNC: 0.2 MG/DL (ref 0.2–1.3)
BUN SERPL-MCNC: 10 MG/DL (ref 7–30)
CALCIUM SERPL-MCNC: 9.6 MG/DL (ref 8.5–10.1)
CHLORIDE SERPL-SCNC: 104 MMOL/L (ref 94–109)
CO2 SERPL-SCNC: 30 MMOL/L (ref 20–32)
CREAT SERPL-MCNC: 0.72 MG/DL (ref 0.52–1.04)
DIFFERENTIAL METHOD BLD: NORMAL
EOSINOPHIL # BLD AUTO: 0.1 10E9/L (ref 0–0.7)
EOSINOPHIL NFR BLD AUTO: 2.4 %
ERYTHROCYTE [DISTWIDTH] IN BLOOD BY AUTOMATED COUNT: 11.6 % (ref 10–15)
GFR SERPL CREATININE-BSD FRML MDRD: >90 ML/MIN/{1.73_M2}
GLUCOSE SERPL-MCNC: 63 MG/DL (ref 70–99)
HCT VFR BLD AUTO: 39.8 % (ref 35–47)
HGB BLD-MCNC: 13.4 G/DL (ref 11.7–15.7)
LYMPHOCYTES # BLD AUTO: 2 10E9/L (ref 0.8–5.3)
LYMPHOCYTES NFR BLD AUTO: 34.1 %
MCH RBC QN AUTO: 30 PG (ref 26.5–33)
MCHC RBC AUTO-ENTMCNC: 33.7 G/DL (ref 31.5–36.5)
MCV RBC AUTO: 89 FL (ref 78–100)
MONOCYTES # BLD AUTO: 0.7 10E9/L (ref 0–1.3)
MONOCYTES NFR BLD AUTO: 12.7 %
NEUTROPHILS # BLD AUTO: 3 10E9/L (ref 1.6–8.3)
NEUTROPHILS NFR BLD AUTO: 50.5 %
PLATELET # BLD AUTO: 266 10E9/L (ref 150–450)
POTASSIUM SERPL-SCNC: 3.3 MMOL/L (ref 3.4–5.3)
PROT SERPL-MCNC: 7.5 G/DL (ref 6.8–8.8)
RBC # BLD AUTO: 4.46 10E12/L (ref 3.8–5.2)
SODIUM SERPL-SCNC: 139 MMOL/L (ref 133–144)
TSH SERPL DL<=0.005 MIU/L-ACNC: 1.05 MU/L (ref 0.4–4)
WBC # BLD AUTO: 5.8 10E9/L (ref 4–11)

## 2021-01-22 PROCEDURE — 36415 COLL VENOUS BLD VENIPUNCTURE: CPT | Performed by: PHYSICIAN ASSISTANT

## 2021-01-22 PROCEDURE — 99214 OFFICE O/P EST MOD 30 MIN: CPT | Performed by: PHYSICIAN ASSISTANT

## 2021-01-22 PROCEDURE — 80050 GENERAL HEALTH PANEL: CPT | Performed by: PHYSICIAN ASSISTANT

## 2021-01-22 RX ORDER — IBUPROFEN 600 MG/1
600 TABLET, FILM COATED ORAL EVERY 6 HOURS PRN
Qty: 40 TABLET | Refills: 0 | Status: CANCELLED | OUTPATIENT
Start: 2021-01-22

## 2021-01-22 ASSESSMENT — ANXIETY QUESTIONNAIRES
2. NOT BEING ABLE TO STOP OR CONTROL WORRYING: NEARLY EVERY DAY
7. FEELING AFRAID AS IF SOMETHING AWFUL MIGHT HAPPEN: NEARLY EVERY DAY
IF YOU CHECKED OFF ANY PROBLEMS ON THIS QUESTIONNAIRE, HOW DIFFICULT HAVE THESE PROBLEMS MADE IT FOR YOU TO DO YOUR WORK, TAKE CARE OF THINGS AT HOME, OR GET ALONG WITH OTHER PEOPLE: EXTREMELY DIFFICULT
6. BECOMING EASILY ANNOYED OR IRRITABLE: SEVERAL DAYS
1. FEELING NERVOUS, ANXIOUS, OR ON EDGE: NEARLY EVERY DAY
5. BEING SO RESTLESS THAT IT IS HARD TO SIT STILL: NEARLY EVERY DAY
3. WORRYING TOO MUCH ABOUT DIFFERENT THINGS: NEARLY EVERY DAY
GAD7 TOTAL SCORE: 19

## 2021-01-22 ASSESSMENT — PATIENT HEALTH QUESTIONNAIRE - PHQ9
5. POOR APPETITE OR OVEREATING: NEARLY EVERY DAY
SUM OF ALL RESPONSES TO PHQ QUESTIONS 1-9: 9

## 2021-01-22 NOTE — PROGRESS NOTES
Assessment & Plan     Atypical chest pain  Patient adamantly declines ekg - wasn't sure what she was expecting but doesn't want me in her personal space.  Not anemic.  Not tachypneic or tachycardic to think about PE   Euthyroid.  Potassium is a little low - encouraged potassium rich foods and recheck in 2 weeks.    - CBC with platelets and differential  - Comprehensive metabolic panel (BMP + Alb, Alk Phos, ALT, AST, Total. Bili, TP)  - TSH with free T4 reflex    Ganglion cyst  Mass of palmar surface of metacarpal phalangeal joint right hand - she is bothered by it   - Orthopedic & Spine  Referral    Conductive hearing loss, bilateral  Phone call to patient - left abruptly after labs drawn without finishing visit - declines ENT and audiology- however referral placed if changes mind     Thumb pain, right  Follow up with ortho- unable to really assess- had planned to look at further after labs drawn but patient left abruptly before I could talk to her   - Orthopedic & Spine  Referral    Hypokalemia  mychart message with recommendation of potassium rich foods and recheck in 2 weeks   - Basic metabolic panel    Cannabis dependence, in remission (H)  No longer using marijuana     Bipolar affective disorder, current episode mixed, current episode severity unspecified (H)  Followed by psychiatry- worsening symptoms - has upcoming appointment                                No follow-ups on file.    Sandy Child PA-C  [facility]    Mik Haji is a 25 year old who presents to clinic today for the following health issues     HPI       Depression and Anxiety Follow-Up    How are you doing with your depression since your last visit? Worsened     How are you doing with your anxiety since your last visit?  Worsened     Are you having other symptoms that might be associated with depression or anxiety? Yes:  chest pain, center of chest, feels like she cannot breathe no  N/V, some heart palpitations        Have you had a significant life event? Health Concerns and OTHER: OCD issues and hypocondriac, obessed with death and dying     Do you have any concerns with your use of alcohol or other drugs? No    Social History     Tobacco Use     Smoking status: Former Smoker     Types: Cigarettes     Smokeless tobacco: Never Used   Substance Use Topics     Alcohol use: Not Currently     Alcohol/week: 0.0 standard drinks     Comment: socially     Drug use: Not Currently     Types: Marijuana     PHQ 10/26/2018 3/26/2019 1/22/2021   PHQ-9 Total Score 4 22 9   Q9: Thoughts of better off dead/self-harm past 2 weeks Several days More than half the days Not at all   Some encounter information is confidential and restricted. Go to Review [employer] [place] activity to see all data.     YAMINI-7 SCORE 10/26/2018 3/26/2019 1/22/2021   Total Score - - -   Total Score 7 15 19   Total Score - - -   Some encounter information is confidential and restricted. Go to Review [employer] [place] activity to see all data.     Last PHQ-9 1/22/2021   1.  Little interest or pleasure in doing things 1   2.  Feeling down, depressed, or hopeless 1   3.  Trouble falling or staying asleep, or sleeping too much 3   4.  Feeling tired or having little energy 0   5.  Poor appetite or overeating 1   6.  Feeling bad about yourself 2   7.  Trouble concentrating 1   8.  Moving slowly or restless 0   Q9: Thoughts of better off dead/self-harm past 2 weeks 0   PHQ-9 Total Score 9   Difficulty at work, home, or with people Somewhat difficult   Some encounter information is confidential and restricted. Go to Review [employer] [place] activity to see all data.     YAMINI-7  1/22/2021   1. Feeling nervous, anxious, or on edge 3   2. Not being able to stop or control worrying 3   3. Worrying too much about different things 3   4. Trouble relaxing 3   5. Being so restless that it is hard to sit still 3   6. Becoming easily annoyed or irritable 1   7. Feeling afraid,  as if something awful might happen 3   YAMINI-7 Total Score 19   If you checked any problems, how difficult have they made it for you to do your work, take care of things at home, or get along with other people? Extremely difficult   Some encounter information is confidential and restricted. Go to Review [employer] activity to see all data.       Suicide Assessment Five-step Evaluation and Treatment (SAFE-T)      How many servings of fruits and vegetables do you eat daily?  2-3    On average, how many sweetened beverages do you drink each day (Examples: soda, juice, sweet tea, etc.  Do NOT count diet or artificially sweetened beverages)?   0    How many days per week do you exercise enough to make your heart beat faster? Walks at least 5 miles a day at work, lifting at work. No specific routine      How many minutes a day do you exercise enough to make your heart beat faster?     How many days per week do you miss taking your medication? 0      -cyst on ring finger, right hand, some pain when applying pressure, cyst sits under her ring  -right thumb issue-possibly from texting, having some mild pain and thumb sticks out now  -hearing issues, possible wax build up, wearing ear plugs at night now due to loud neighbors    Reports symptoms Feel like anxiety but never know.  Sometimes has palpitaitons   Quit drinking coffee a week ago.   Complains of left sided chest pain.  Reports that she has some tenderness left anterior chest--sensitive to touch.  Symptoms for Few days  Works and go to study- general studies.  Works at [employer]  No cough. No body aches. No fever or night sweats.     Has a psychiatrist- has appointment in a couple weeks ([facility]).  History of bipolar disorder on lamictal and buspar- no longer uses marijuana     Patient declines EKG- not sure what she was expecting but too close and concerned I am in her physical space         Review of Systems   Constitutional, HEENT, cardiovascular, pulmonary,  gi and gu systems are negative, except as otherwise noted.      Objective    /68   Pulse 85   Temp 98.3  F (36.8  C) (Oral)   Resp 14   Wt 60.8 kg (134 lb)   SpO2 100%   BMI 20.99 kg/m    Body mass index is 20.99 kg/m .  Physical Exam   GENERAL: alert, no distress and frail  Tympanic membrane appear normal bilaterally-no significant cerumen present   NECK: no adenopathy, no asymmetry, masses, or scars and thyroid normal to palpation  RESP: lungs clear to auscultation - no rales, rhonchi or wheezes  BREAST: normal without masses, tenderness or nipple discharge and no palpable axillary masses or adenopathy  CV: regular rate and rhythm, normal S1 S2, no S3 or S4, no murmur, click or rub, no peripheral edema and peripheral pulses strong  ABDOMEN: soft, nontender, no hepatosplenomegaly, no masses and bowel sounds normal  MS: palmar surface of right ring finger at metacarpal phalangeal joint with small subcutaneous mass mobile without erythema or warmth but slightly tender to palpation   PSYCH: mentation appears normal, tearful, anxious, judgement and insight intact and appearance well groomed    Results for orders placed or performed in visit on 01/22/21   CBC with platelets and differential     Status: None   Result Value Ref Range    WBC 5.8 4.0 - 11.0 10e9/L    RBC Count 4.46 3.8 - 5.2 10e12/L    Hemoglobin 13.4 11.7 - 15.7 g/dL    Hematocrit 39.8 35.0 - 47.0 %    MCV 89 78 - 100 fl    MCH 30.0 26.5 - 33.0 pg    MCHC 33.7 31.5 - 36.5 g/dL    RDW 11.6 10.0 - 15.0 %    Platelet Count 266 150 - 450 10e9/L    % Neutrophils 50.5 %    % Lymphocytes 34.1 %    % Monocytes 12.7 %    % Eosinophils 2.4 %    % Basophils 0.3 %    Absolute Neutrophil 3.0 1.6 - 8.3 10e9/L    Absolute Lymphocytes 2.0 0.8 - 5.3 10e9/L    Absolute Monocytes 0.7 0.0 - 1.3 10e9/L    Absolute Eosinophils 0.1 0.0 - 0.7 10e9/L    Absolute Basophils 0.0 0.0 - 0.2 10e9/L    Diff Method Automated Method    Comprehensive metabolic panel (BMP + Alb,  Alk Phos, ALT, AST, Total. Bili, TP)     Status: Abnormal   Result Value Ref Range    Sodium 139 133 - 144 mmol/L    Potassium 3.3 (L) 3.4 - 5.3 mmol/L    Chloride 104 94 - 109 mmol/L    Carbon Dioxide 30 20 - 32 mmol/L    Anion Gap 5 3 - 14 mmol/L    Glucose 63 (L) 70 - 99 mg/dL    Urea Nitrogen 10 7 - 30 mg/dL    Creatinine 0.72 0.52 - 1.04 mg/dL    GFR Estimate >90 >60 mL/min/[1.73_m2]    GFR Estimate If Black >90 >60 mL/min/[1.73_m2]    Calcium 9.6 8.5 - 10.1 mg/dL    Bilirubin Total 0.2 0.2 - 1.3 mg/dL    Albumin 4.2 3.4 - 5.0 g/dL    Protein Total 7.5 6.8 - 8.8 g/dL    Alkaline Phosphatase 74 40 - 150 U/L    ALT 21 0 - 50 U/L    AST 11 0 - 45 U/L   TSH with free T4 reflex     Status: None   Result Value Ref Range    TSH 1.05 0.40 - 4.00 mU/L

## 2021-01-23 PROBLEM — F12.21 CANNABIS DEPENDENCE, IN REMISSION (H): Status: ACTIVE | Noted: 2021-01-23

## 2021-01-23 ASSESSMENT — ANXIETY QUESTIONNAIRES: GAD7 TOTAL SCORE: 19

## 2021-01-23 NOTE — RESULT ENCOUNTER NOTE
"Please call and advise - also sent [employer] message    Brandeedeb Haji  Your thyroid function was normal.   Your blood counts and hemoglobin were normal.   Your kidney and liver function were normal.     Your potassium was a little low.   I recommend increasing potassium in your diet and recheck a potassium level (nonfasting lab only appointment) in approximately 2 weeks.      The normal adult diet usually contains 50-100mEq of potassium per day. More potassium is needed when there is excess loss of potassium from the body. Diuretic medicines (\"water pills\") or prolonged diarrhea and vomiting can cause this. To increase the amount of potassium in your diet, include these foods.         [The (*) indicates highest potassium.]   VEGETABLES   Artichokes - cooked 1/2 cup 400-600 mg (10-15mEq) *   Asparagus - fresh or frozen 1/2 cup 200-400 mg (5-10mEq)   Beans, white, red, keyes 1/2 cup 400-600 mg (10-15mEq) *   Beets - fresh or cooked 1/2 cup 200-400 mg (5-10mEq)   Broccoli - cooked, fresh or frozen 1/2 cup 200-400 mg (5-10mEq)   Brussel sprouts - fresh or frozen 1/2 cup 200-400 mg (5-10mEq)   Cabbage, raw 1 cup 200-400 mg (5-10mEq)   Carrots - fresh, raw or cooked 1/2 cup 200-400 mg (5-10mEq)   Celery - raw 1 cup 200-400 mg (5-10mEq)   Eggplant - baked 1/2 cup 200-400 mg (5-10mEq)   Pablo Beans - fresh or frozen 1/2 cup 200-400 mg (5-10mEq)   Mushrooms - fresh, cooked 1/2 cup 200-400 mg (5-10mEq)   Peas - cooked 1/2 cup 200-400 mg (5-10mEq)   Potatoes - baked 1/2 cup 400-600 mg (10-15mEq) *   Spinach - raw, chopped 2 cups 400-600 mg (10-15mEq) *   Spinach - whole leaves 3 cups 400-600 mg (10-15mEq) *   Squash, winter - fresh, frozen, cooked 1/2 cup 200-400 mg (5-10mEq)   Tomato - fresh 1 med 200-400 mg (5-10mEq)   Tomato juice - canned 1/2 cup 200-400 mg (5-10mEq)   FRUITS   Apple juice - fresh or canned, unsweetened 1 cup 200-400 mg (5-10mEq)   Apricots - fresh 2 med 200-400 mg (5-10mEq)   Apricots - canned 1/2 cup 200-400 " mg (5-10mEq)   Apricots - dried 4 pieces 200-400 mg (5-10mEq)   Banana - fresh 1 small 200-400 mg (5-10mEq)   Blackberries - fresh or frozen 1 cup 200-400 mg (5-10mEq)   Cantaloupe - fresh 6 inch diameter 400-600 mg (10-15mEq) *   Grape juice, unsweetened 1 cup 200-400 mg (5-10mEq)   Honeydew melon - fresh 7 inch diameter 400-600 mg (10-15mEq) *   Orange juice, unsweetened, fresh or frozen 1/2 cup 200-400 mg (5-10mEq)   Orange - fresh 1 med 200-400 mg (5-10mEq)   Mandarin - fresh 1 med 200-400 mg (5-10mEq)   Pineapple juice - unsweetened, canned/frozen 1 cup 200-400 mg (5-10mEq)   Prune juice - unsweetened, canned 1/2 cup 200-400 mg (5-10mEq)   Prunes - dried 8 pieces 200-400 mg (5-10mEq)   Raspberries - fresh or frozen 1 cup 200-400 mg (5-10mEq)   Strawberries - fresh or frozen 1 cup 200-400 mg (5-10mEq)   Tangerine - fresh 1 med 200-400 mg (5-10mEq)   MEAT   Red Meat - cooked 3 oz 200-400 mg (5-10mEq)   Shrimp - fresh or cooked 3/4 cup 200-400 mg (5-10mEq)   Tuna - fresh or canned 3/4 cup 200-400 mg (5-10mEq)   Cod, Flounder, Halibut - cooked 3.5 oz 400-600 mg (10-15mEq) *   Burton - fresh or cooked 3.5 oz 400-600 mg (10-15mEq) *   Scallops - cooked 3.5 oz 400-600 mg (10-15mEq) *   [Modified from Diet Manual, Clinical Center, National Institutes of Health]     0952-6019 [facility], [address] [place] [address], [place] [address]. All rights reserved. This information is not intended as a substitute for professional medical care. Always follow your healthcare professional's instructions

## 2021-01-26 ENCOUNTER — OFFICE VISIT (OUTPATIENT)
Dept: ORTHOPEDICS | Facility: CLINIC | Age: 26
End: 2021-01-26
Payer: COMMERCIAL

## 2021-01-26 DIAGNOSIS — M67.40 GANGLION CYST: ICD-10-CM

## 2021-01-26 DIAGNOSIS — M79.644 THUMB PAIN, RIGHT: ICD-10-CM

## 2021-01-26 PROCEDURE — 99203 OFFICE O/P NEW LOW 30 MIN: CPT | Performed by: PLASTIC SURGERY

## 2021-01-26 ASSESSMENT — PAIN SCALES - GENERAL: PAINLEVEL: NO PAIN (0)

## 2021-01-26 NOTE — PATIENT INSTRUCTIONS
Thanks for coming today.  [facility]  [address] [place] [address]    To schedule future appointments in Ortho Clinic, you may call [phone].    To schedule ordered imaging by your provider:   Call [facility] [phone]    To schedule an injection ordered by your provider:  Call [facility] Injection scheduling line: [phone]  [facility] available online at:  [email or web address]    Please call if any further questions or concerns ([phone]).  Clinic hours 8 am to 5 pm.    Return to clinic (call) if symptoms worsen or fail to improve.

## 2021-01-26 NOTE — PROGRESS NOTES
CONSULT NOTE      REFERRING PHYSICIAN:  Sandy Child PA-C.      PRESENTING COMPLAINT:  Consultation for right ring finger mass and right thumb pain.      HISTORY OF PRESENTING COMPLAINT:  Raissa is 25 years old.  She is right hand dominant.  She has noticed a small mass in the volar aspect of her right ring finger for about 3 months.  It has remained stable in size.  Minimal pain.  No real issues.  Additionally, she has been noticing some right thumb pain whenever she uses it a lot.  It is in the extensor pollicis longus tendon.      PAST MEDICAL HISTORY:  Depression, anxiety.      PAST SURGICAL HISTORY:  Appendectomy, wisdom teeth extraction.      MEDICATIONS:  Lamictal and buspirone.      ALLERGIES:  Nil.      SOCIAL HISTORY:  Does not actively smoke, quit 8 months ago, used to smoke about a pack a day for multiple years.      REVIEW OF SYSTEMS:  Denies chest pain, shortness of breath, MI, CVA, DVT and PE.      PHYSICAL EXAMINATION:  Vital signs are stable.  She is afebrile, in no obvious distress.  She has about a 1 mm x 1 mm small mass in the subcutaneous plane of the right volar aspect of her ring finger base.  She has some nonspecific tenderness over the EPL tendon and basilar thumb.  No swelling, full range of motion.      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  For the tendinitis, we recommended a thumb spica splint and giving time for it to heal and then slowly returning to normal activities.  If it works, great.  If it does not, steroid injections can be done.  With regards to the retinacular cyst, right now nothing needs to be done.  We will follow it closely.  If it gets bigger, changes in nature, excision can be done.  She understood.  I will see her back p.r.n.      Total time spent with chart review, visit and post-visit paperwork was 30 minutes.      cc:   Sandy Child PA-C   [address] [place]  [address]

## 2021-01-26 NOTE — LETTER
1/26/2021         RE: Raissa Ruth  [address]  [facility] [address]        Dear Colleague,    Thank you for referring your patient, Raissa Ruth, to the [facility]. Please see a copy of my visit note below.    CONSULT NOTE      REFERRING PHYSICIAN:  Sandy Child PA-C.      PRESENTING COMPLAINT:  Consultation for right ring finger mass and right thumb pain.      HISTORY OF PRESENTING COMPLAINT:  Raissa is 25 years old.  She is right hand dominant.  She has noticed a small mass in the volar aspect of her right ring finger for about 3 months.  It has remained stable in size.  Minimal pain.  No real issues.  Additionally, she has been noticing some right thumb pain whenever she uses it a lot.  It is in the extensor pollicis longus tendon.      PAST MEDICAL HISTORY:  Depression, anxiety.      PAST SURGICAL HISTORY:  Appendectomy, wisdom teeth extraction.      MEDICATIONS:  Lamictal and buspirone.      ALLERGIES:  Nil.      SOCIAL HISTORY:  Does not actively smoke, quit 8 months ago, used to smoke about a pack a day for multiple years.      REVIEW OF SYSTEMS:  Denies chest pain, shortness of breath, MI, CVA, DVT and PE.      PHYSICAL EXAMINATION:  Vital signs are stable.  She is afebrile, in no obvious distress.  She has about a 1 mm x 1 mm small mass in the subcutaneous plane of the right volar aspect of her ring finger base.  She has some nonspecific tenderness over the EPL tendon and basilar thumb.  No swelling, full range of motion.      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  For the tendinitis, we recommended a thumb spica splint and giving time for it to heal and then slowly returning to normal activities.  If it works, great.  If it does not, steroid injections can be done.  With regards to the retinacular cyst, right now nothing needs to be done.  We will follow it closely.  If it gets bigger, changes in  nature, excision can be done.  She understood.  I will see her back p.r.n.      Total time spent with chart review, visit and post-visit paperwork was 30 minutes.      cc:   Sandy Child PA-C   [address] [place]  [address]           Again, thank you for allowing me to participate in the care of your patient.        Sincerely,        ENE Ruiz MD

## 2021-03-16 ENCOUNTER — OFFICE VISIT (OUTPATIENT)
Dept: FAMILY MEDICINE | Facility: CLINIC | Age: 26
End: 2021-03-16
Payer: COMMERCIAL

## 2021-03-16 ENCOUNTER — ANCILLARY PROCEDURE (OUTPATIENT)
Dept: GENERAL RADIOLOGY | Facility: CLINIC | Age: 26
End: 2021-03-16
Attending: FAMILY MEDICINE
Payer: COMMERCIAL

## 2021-03-16 VITALS
SYSTOLIC BLOOD PRESSURE: 98 MMHG | OXYGEN SATURATION: 98 % | RESPIRATION RATE: 10 BRPM | DIASTOLIC BLOOD PRESSURE: 58 MMHG | WEIGHT: 135.7 LBS | BODY MASS INDEX: 21.25 KG/M2 | HEART RATE: 86 BPM

## 2021-03-16 DIAGNOSIS — M54.50 CHRONIC BILATERAL LOW BACK PAIN WITHOUT SCIATICA: Primary | ICD-10-CM

## 2021-03-16 DIAGNOSIS — G89.29 CHRONIC BILATERAL LOW BACK PAIN WITHOUT SCIATICA: Primary | ICD-10-CM

## 2021-03-16 DIAGNOSIS — M54.50 CHRONIC BILATERAL LOW BACK PAIN WITHOUT SCIATICA: ICD-10-CM

## 2021-03-16 DIAGNOSIS — G89.29 CHRONIC BILATERAL LOW BACK PAIN WITHOUT SCIATICA: ICD-10-CM

## 2021-03-16 PROCEDURE — 72100 X-RAY EXAM L-S SPINE 2/3 VWS: CPT | Mod: FY | Performed by: RADIOLOGY

## 2021-03-16 PROCEDURE — 99214 OFFICE O/P EST MOD 30 MIN: CPT | Performed by: FAMILY MEDICINE

## 2021-03-16 NOTE — LETTER
March 16, 2021      Raissa Ruth  [address] [place] [id number]              To whom it may concern:    Raissa Ruth was seen in the clinic today for back pain. Please excuse her form work for 03/17/2021            Sincerely,      Lenny Guillermo MD

## 2021-03-16 NOTE — PATIENT INSTRUCTIONS
Get the xrays today  Call  to schedule for sports consult  Start on PT for low back pain  Patient Education     Back Care Tips     Caring for your back  These are things you can do to prevent a recurrence of acute back pain and to reduce symptoms from chronic back pain:    Stay at a healthy weight. If you are overweight, losing weight will help most types of back pain.    Exercise is an important part of recovery from most types of back pain. The muscles behind and in front of the spine support the back. This means strengthening both the back muscles and the abdominal muscles will provide better support for your spine.     Swimming and brisk walking are good overall exercises to improve your fitness level.    Practice safe lifting methods (see below).    Practice good posture when sitting, standing, and walking. Don't sit for a long time. This puts more stress on the lower back than standing or walking.    Wear quality shoes with good arch support. Foot and ankle alignment can affect back symptoms. Don't wear high heels.    Therapeutic massage can help relax the back muscles without stretching them.    During the first 24 to 72 hours after an acute injury or flare-up of chronic back pain, put an ice pack on the painful area for 20 minutes and then remove it for 20 minutes. Do thisover a period of 60 to 90 minutes, or several times a day. As a safety precaution, don't use a heating pad at bedtime. Sleeping on a heating pad can lead to skin burns or tissue damage.    You can alternate using ice and heat.  Medicines  Talk with your healthcare provider before using medicines, especially if you have other health problems or are taking other medicines.    You may use over-the-counter medicines, such as acetaminophen, ibuprofen, or naprosyn to control pain, unless your healthcare provider prescribed other pain medicine. Talk with your healthcare provider before taking any medicines if you have a chronic  condition such as diabetes, liver or kidney disease, stomach ulcers, or digestive bleeding, or are taking blood thinners.    Be careful if you are given prescription pain medicines, opioids, or medicine for muscle spasm. They can cause drowsiness, and affect your coordination, reflexes, and judgment. Don't drive or operate heavy machinery while taking these types of medicines. Take prescription pain medicine only as prescribed by your healthcare provider.  Lumbar stretch  This simple stretch will help relax muscle spasm and keep your back more limber. If exercise makes your back pain worse, don t do it.    Lie on your back with your knees bent and both feet on the ground.    Slowly raise your left knee to your chest as you flatten your lower back against the floor. Hold for 5 seconds.    Relax and repeat the exercise with your right knee.    Do 10 of these exercises for each leg.  Safe lifting method    Don t bend over at the waist to lift an object off the floor.  Instead, bend your knees and hips in a squat.     Keep your back and head upright    Hold the object close to your body, directly in front of you.    Straighten your legs to lift the object.     Lower the object to the floor in the reverse fashion.    If you must slide something across the floor, push it.    Posture tips  Sitting  Sit in chairs with straight backs or low-back support. Keep your knees lower than your hips, with your feet flat on the floor.  When driving, sit up straight. Adjust the seat forward so you are not leaning toward the steering wheel.  A small pillow or rolled towel behind your lower back may help if you are driving long distances.   Standing  When standing for long periods, shift most of your weight to one leg at a time. Switch legs every few minutes.   Sleeping  The best way to sleep is on your side with your knees bent. Put a low pillow under your head to support your neck in a neutral spine position. Don't use thick pillows  that bend your neck to one side. Put a pillow between your legs to further relax your lower back. If you sleep on your back, put pillows under your knees to support your legs in a slightly flexed position. Use a firm mattress. If your mattress sags, replace it, or use a 1/2-inch plywood board under the mattress to add support.  Follow-up care  Follow up with your healthcare provider, or as advised.  If X-rays, a CT scan or an MRI scan were taken, they may be reviewed by a radiologist. You will be told of any new findings that may affect your care.  Call 911  Call 911 if any of the following occur:    Trouble breathing    Confusion    Very drowsy    Fainting or loss of consciousness    Rapid or very slow heart rate    Loss of  bowel or bladder control  When to seek medical advice  Call your healthcare provider right away if any of the following occur:    Pain becomes worse or spreads to your arms or legs    Weakness or numbness in one or both arms or legs    Numbness in the groin area  [employer] last reviewed this educational content on 11/1/2019 2000-2020 [employer]. All rights reserved. This information is not intended as a substitute for professional medical care. Always follow your healthcare professional's instructions.           Patient Education     Relieving Back Pain  Back pain is a common problem. You can strain back muscles by lifting too much weight or just by moving the wrong way. Back strain can be uncomfortable, even painful. And it can take weeks or months to improve. To help yourself feel better and prevent future back strains, try these tips.  Important: Don't give aspirin to children or teens without first discussing it with your child's healthcare provider.  Ice    Ice reduces muscle pain and swelling. It helps most during the first 24 to 48 hours after an injury.    Wrap an ice pack or a bag of frozen peas in a thin towel. Never put ice directly on your skin.    Place the ice  where your back hurts the most.    Don t ice for more than 20 minutes at a time.    You can use ice several times a day.  Medicines  Over-the-counter pain relievers include acetaminophen and anti-inflammatory medicines, which includes aspirin, naproxen, or ibuprofen. They can help ease discomfort. Some also reduce swelling.    Tell your healthcare provider about any medicines you are already taking.    Take medicines only as directed.  Manipulation and massage  Having manipulation by an osteopathic doctor or chiropractor may be helpful. Getting a massage also may help.   Heat  After the first 48 hours, heat can relax sore muscles and improve blood flow.    Try a warm bath or shower. Or use a heating pad set on low. To prevent a burn, keep a cloth between you and the heating pad.    Don t use a heating pad for more than 15 minutes at a time. Never sleep on a heating pad.  [employer] last reviewed this educational content on 6/1/2018 2000-2020 [employer]. All rights reserved. This information is not intended as a substitute for professional medical care. Always follow your healthcare professional's instructions.           Patient Education     Back Safety: Basics of Good Posture  Good posture helps protect you from injury. It also increases your comfort. Aim for good posture throughout the day.    Check your posture  The human body works best when it is properly aligned. To improve your standing posture, follow these steps:    Take a moment to close your eyes and feel your body. Then breathe deeply and relax your shoulders, hips, and knees.    Now, from the very top of your head, lift up just a bit. Think of a line linking your ears, shoulders, hips, and ankles. Adjust your body to follow the line. You may need to relax your hips and tuck your buttocks under a bit.    Next, take a look at yourself in a mirror. Is one ear, shoulder, or hip higher than the other? They should be level.  Check how you  sit  When you sit properly, pressure on your back is reduced. Try these steps:    Sit so that the curve of your lower back fits easily against the chair. Keep your gaze level.    Support your feet. They should be flat on the floor or on a footrest. Your knees should be level with your hips.    Adjust the chair height as needed. Sit so your forearms are level with the work surface.  Proper posture helps  When your back is aligned, it s more likely to stay safe throughout the day.    Standing in place. Rest one foot on a stool or low box to ease pressure on your lower back. Switch feet often. If you can, adjust the height of your work surface so your neck and shoulders aren t under strain.    Driving. Sit close enough to the steering wheel to keep your knees slightly bent. For comfort, your knees should be level with your hips or just a bit lower. Sit as straight as you can. The curve of your lower back should be fully supported.    Walking. Stand tall and walk with your head up. Let your arms swing while you walk. This helps relax muscles. Wear shoes that fit and support your feet. If you will be standing or walking for a long time, don t wear high heels.    Sitting and sleeping. Choose your furniture with care. Make sure it s not causing or increasing your back pain. Chairs should allow for comfortable, correct sitting posture. Use pillows for added support if needed. Your bed should support your back s natural curves without being too hard or too soft.  [employer] last reviewed this educational content on 5/1/2018 2000-2020 [employer]. All rights reserved. This information is not intended as a substitute for professional medical care. Always follow your healthcare professional's instructions.           Patient Education     Relieving Tension in Your Back  Being relaxed helps keep your mind healthy and your back ready to move. Take short breaks often. Walk around. Stretch. Switch tasks. Also give the  following a try.  Make time to relax. Start by setting aside 5 minutes daily.  Deep breathing    Deep breathing is a simple way to reduce stress. You can do it almost any time you need to relax.    Inhale slowly through your nose. Let your lungs and stomach expand.    Hold your breath for 2 to 3 seconds.    Exhale slowly through your mouth until your lungs feel empty. Repeat 3 to 4 times.  Relieve tension  Muscle tension can create tender spots called trigger points. The tips below may help relieve muscle tension.    Press the trigger point if you can reach it. If not, lie on a soft tennis ball, or ask a friend to press the spot. Use steady pressure for 10 to 15 seconds. Breathe deeply. Repeat a few times.    Massage trigger points with ice for 2 to 5 minutes. Press lightly at first. Slowly increase firmness.  [employer] last reviewed this educational content on 5/1/2018 2000-2020 [employer]. All rights reserved. This information is not intended as a substitute for professional medical care. Always follow your healthcare professional's instructions.           Patient Education     Caring for Your Back Throughout the Day  Take care of your back throughout the day. You will likely have fewer back problems if you do. Try to warm up before you move. Shift positions often. Also do your best to form healthy habits.    Warm up for the day  Do a few slow, catlike stretches before starting your day. This simple warmup can soften your disks, stretch your back muscles, and help prevent injuries.  Shift positions often  At work and at home, change positions often. This helps keep your body from getting stiff. Stand up or lean back while you sit. If you can, get up and move every 1/2 hour.  Form healthy habits  Here are some suggestions:     Keep a healthy weight. When you weigh too much, your back is under excess strain. But losing just a few extra pounds (kilograms) can help a lot.    Try not to overeat. Learn  about serving sizes. The size of a serving depends on the food and the food group. Many foods list serving sizes on the labels.    Handle minor aches with cold and heat. Apply cold the first 24 to 48 hours. Use heat after that. Always place a thin cloth between your skin and the source of cold or heat.    Take medicines as directed. This helps keep pain under control. Always read labels, and call your healthcare provider or pharmacist if you have any questions.  Walk each day  A daily walk keeps your back and thigh muscles stretched and strong. This gives your back better support. Be sure to walk with your spine s three curves aligned, by keeping your head, hips, and toes connected by a vertical line.  [employer] last reviewed this educational content on 5/1/2018 2000-2020 [employer]. All rights reserved. This information is not intended as a substitute for professional medical care. Always follow your healthcare professional's instructions.

## 2021-03-16 NOTE — PROGRESS NOTES
Assessment & Plan     Chronic bilateral low back pain without sciatica  Due to age and chronicity of symptoms of low back pain, recommended lumbar x-rays for further evaluation  X-rays were reviewed-normal  Recommended over-the-counter Tylenol 500 mg every 4-6 hours as needed for pain, avoid triggering activities, use local ice and heat as needed for pain.  We will start on physical therapy, recommended to consult sports medicine physician for further evaluation of her ongoing chronic low back pain  Patient requested a letter for work excuse for tomorrow so she can rest, before starting back on the weekend  Letter was given, recommended to continue to be mobile and ambulatory as tolerated  Patient verbalised understanding and is agreeable to the plan.    - XR Lumbar Spine 2/3 Views; Future  - Orthopedic & Spine  Referral; Future  - [facility]ERR[facility]; Future             Chart documentation done in part with Dragon Voice recognition Software. Although reviewed after completion, some word and grammatical error may remain.    See Patient Instructions    No follow-ups on file.    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  Denies back stiffness family or personal history of inflammatory arthritis including ankylosing spondylitis.  Patient is currently using over-the-counter ibuprofen, Tylenol and heating with relief of symptoms when she uses them  Denies new onset of bladder or bowel incontinence.  Patient denies  fever, chills,, recent weight loss, history of fall or trauma to the back.  Denies abnormal skin rashes.  Patient with history significant for  depression, anxiety.  Other HPI as mentioned below    Onset/Duration: worsening low back pains possibly due to work, patient feels that being timed is making her back pains worse   Description:   Location of pain: low back bilateral  Character of pain: sharp, dull ache, stabbing and gnawing  Pain radiation: radiates into the right buttocks and radiates into the left buttocks  New numbness or weakness in legs, not attributed to pain: no   Intensity: Currently 7/10, moderate  Progression of Symptoms: worsening  History:   Specific cause: work-related  Pain interferes with job: YES  History of back problems: no prior back problems  Any previous MRI or X-rays: None  Sees a specialist for back pain: No  Alleviating factors:   Improved by: tylenol, NSAIDS and heat    Precipitating factors:  Worsened by: Lifting, Bending, Standing and Sitting  Therapies tried and outcome: acetaminophen (Tylenol), heat and NSAIDs    Accompanying Signs & Symptoms:  Risk of Fracture: None  Risk of Cauda Equina: None  Risk of Infection: None  Risk of Cancer: None  Risk of Ankylosing Spondylitis: Onset at age <35, male, AND morning back stiffness YES            Review of Systems   CONSTITUTIONAL: NEGATIVE for fever, chills, change in weight  GI: No bowel incontinence  : No urinary incontinence  MUSCULOSKELETAL: as above  NEURO: NEGATIVE for weakness, dizziness or paresthesias  PSYCHIATRIC: History of depression anxiety      Objective    BP 98/58   Pulse 86   Resp 10   Wt 61.6 kg (135 lb 11.2 oz)   SpO2 98%   BMI 21.25 kg/m    Body mass index is 21.25 kg/m .  Physical Exam   GENERAL: healthy, alert and no distress  MS: no gross musculoskeletal defects noted, no edema  MS: normal gait  SKIN: no suspicious lesions or rashes  NEURO: Normal strength and tone, mentation intact and speech normal  BACK:  no CVA tenderness, no paralumbar tenderness  Comprehensive back pain exam:  No tenderness, Range of motion not limited by pain, Lower extremity strength functional and equal on both sides, Lower extremity reflexes within normal limits bilaterally, Lower extremity sensation normal and equal on both sides and Straight leg raise negative bilaterally  PSYCH: mentation appears normal, affect normal/bright    Results for orders placed or performed in visit on 03/16/21   XR Lumbar Spine 2/3 Views     Status: None    Narrative    XR LUMBAR SPINE 2-3 VIEWS 3/16/2021 3:38 PM     COMPARISON: 2/13/2015 CT abdomen/pelvis    HISTORY: Chronic bilateral low back pain without sciatica; Chronic  bilateral low back pain without sciatica    FINDINGS: Normal vertebral body heights and alignment. There is  straightening of the lumbar lordosis. 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 No significant degenerative  disc disease or facet osteoarthrosis. Normal sacroiliac joints.      Impression    IMPRESSION: No acute lumbar spine abnormality    LIDYA HANSON MD

## 2021-03-16 NOTE — RESULT ENCOUNTER NOTE
Baldomero Haji,  Your back x-ray showed no concerns for inflammatory arthritis.  Please follow-up with the treatment plan as we discussed including starting on physical therapy and seeing sports medicine orthopedic physician at the [facility] for further evaluation of your chronic back pain.   Let me know if you have any questions. Take care.  Lenny Guillermo MD

## 2021-03-17 ENCOUNTER — OFFICE VISIT (OUTPATIENT)
Dept: ORTHOPEDICS | Facility: CLINIC | Age: 26
End: 2021-03-17
Attending: FAMILY MEDICINE
Payer: COMMERCIAL

## 2021-03-17 VITALS
SYSTOLIC BLOOD PRESSURE: 104 MMHG | HEIGHT: 67 IN | WEIGHT: 135 LBS | DIASTOLIC BLOOD PRESSURE: 69 MMHG | BODY MASS INDEX: 21.19 KG/M2

## 2021-03-17 DIAGNOSIS — M54.50 CHRONIC BILATERAL LOW BACK PAIN WITHOUT SCIATICA: ICD-10-CM

## 2021-03-17 DIAGNOSIS — G89.29 CHRONIC BILATERAL LOW BACK PAIN WITHOUT SCIATICA: ICD-10-CM

## 2021-03-17 PROCEDURE — 99203 OFFICE O/P NEW LOW 30 MIN: CPT | Performed by: FAMILY MEDICINE

## 2021-03-17 ASSESSMENT — MIFFLIN-ST. JEOR: SCORE: 1394.99

## 2021-03-17 NOTE — PROGRESS NOTES
"Raissa Ruth  :  1995  DOS: 3/17/2021  MRN: [id number]    Sports Medicine Clinic Visit    PCP: Clinic, [facility]    Raissa Ruth is a 25 year old female who is seen in consultation at the request of  Lenny Guillermo M.D. presenting with acute on chronic low back pain.    Injury: Gradual onset of generalized low back pain, mostly with standing/lifting at work over the past 3 - 4 months, significantly worse over the past one week.  Pain located over bilateral lower lumbar spine, right>>>left, nonradiating.  Additional Features:  Positive: weakness and muscle tension/spasm.  Symptoms are better with Ibuprofen and Rest.  Symptoms are worse with: bending at waist, twisting, lifting, prolonged standing.  Other evaluation and/or treatments so far consists of: Ice, Heat, Tylenol, Ibuprofen, Rest and PCP.  Recent imaging completed: X-rays completed 3/16/21.  Prior History of related problems: history of intermittent low back pain over the past 5+ years that she has not previously treated.  Denies morning pain/stiffness, actually feels better at that time.    Social History: works at grocery store - stocks shelves      Review of Systems  Musculoskeletal: as above  Remainder of review of systems is negative including constitutional, CV, pulmonary, GI, Skin and Neurologic except as noted in HPI or medical history.    Past Medical History:   Diagnosis Date     Anxiety      Bipolar 1 disorder (H)      Depression      History of Deliberate self-cutting      Splenomegaly 2015    when she had mono     Substance abuse (H)      Past Surgical History:   Procedure Laterality Date     APPENDECTOMY       HC TOOTH EXTRACTION W/FORCEP       Family History   Problem Relation Age of Onset     Breast Cancer Maternal Grandmother      Breast Cancer Paternal Grandmother      Depression Mother        Objective  /69   Ht 1.702 m (5' 7\")   Wt 61.2 kg (135 lb)   BMI 21.14 kg/m        General: " healthy, alert and in no distress      HEENT: no scleral icterus or conjunctival erythema     Skin: no suspicious lesions or rash. No jaundice.     CV: regular rhythm by palpation, 2+ distal pulses, no pedal edema      Resp: normal respiratory effort without conversational dyspnea     Psych: normal mood and affect      Gait: nonantalgic, appropriate coordination and balance     Neuro: normal light touch sensory exam of the extremities. Motor strength as noted below       Low back exam:    Inspection:       no visible deformity in the low back       normal skin       normal vascular       normal lymphatic    ROM:       full flexion       limited extension due to pain       asymmetric rotation of the pelvis with flexion       Mild pain with SB and rotation       Decreased hamstring flexibility    Tender:       paraspinal muscles       Lower lumbar, b/l SI    Non Tender:       remainder of lumbar spine    Strength:       hip flexion 5/5       knee extension 5/5       ankle dorsiflexion 5/5       ankle plantarflexion 5/5       dorsiflexion of the great toe 5/5    Reflexes:       patellar (L3, L4) symmetric normal       achilles tendons (S1) symmetric normal    Sensation:      grossly intact throughout lower extremities    Skin:       well perfused       capillary refill brisk    Special tests:       straight leg raise left neg for radicular sx         straight leg raise right neg for radicular sx       positive (+) LILIANE mild b/l for SI joint pain       Neg slump, mild + facet compression     Radiology:  Recent Results (from the past 744 hour(s))   XR Lumbar Spine 2/3 Views    Narrative    XR LUMBAR SPINE 2-3 VIEWS 3/16/2021 3:38 PM     COMPARISON: 2/13/2015 CT abdomen/pelvis    HISTORY: Chronic bilateral low back pain without sciatica; Chronic  bilateral low back pain without sciatica    FINDINGS: Normal vertebral body heights and alignment. There is  straightening of the lumbar lordosis. 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 No significant degenerative  disc disease or facet osteoarthrosis. Normal sacroiliac joints.      Impression    IMPRESSION: No acute lumbar spine abnormality    LIDYA HANSON MD       Assessment:  1. Chronic bilateral low back pain without sciatica        Plan:  Discussed the assessment with the patient.  Follow up: prn based on clinical progress  Several factors that relate to LBP including work duties, ergonomicsmechanics, posture/core stability, some signs of hypermobility, relatively straightened lumbar lordosis  Straightened lumbar spine seen on XR could be positional, due to muscle tension, and have a genetic component  No radicular sx on exam  XR images independently visualized and reviewed with patient today in clinic  Reviewed option for advanced imaging if sx worsen, do not improve, or change over time, defer for now  Many of the potential factors we reviewed are treated with conditioning, PT, and accommodations as needed  PT offered today, declined for now,   Letter for work provided today given the timing of increased LBP with changes in work duties, will place restrictions for now and amend if needed  Use of short 1-2 wk course of NSAIDs reviewed, dosing and precautions reviewed if utilized, reviewed risks of longer term use  Topical voltaren gel is safe if helpful  We discussed modified progressive pain-free activity as tolerated  Home handouts provided and supportive care reviewed  All questions were answered today  Contact us with additional questions or concerns  Signs and sx of concern reviewed    Thanks very much for sending this nice lady to us, I will keep you updated with her progress      Eduardo Duff DO, [facility]  Sports Medicine Physician  [facility]              Disclaimer: This note consists of symbols derived from keyboarding, dictation and/or voice recognition software. As a result, there may be errors in the  script that have gone undetected. Please consider this when interpreting information found in this chart.

## 2021-03-17 NOTE — LETTER
March 17, 2021      Raissa was seen in my office today.  For the next 4 weeks I recommend a 10 pound lift/carry restriction, limited lifting from ground level, decreased bending and squatting as much as possible if painful.  Lifting overhead should also be avoided if painful.  Allowing flexibility for her to work at a comfortable pace while completing her job duties will also be helpful to minimize further restrictions and/or length of restrictions.  I would also encourage scheduling no more than 2-3 work days in a row for now as well.  Updated recommendations will be provided as needed based on her progress.      Eduardo Loaiza DO, CAQ  Primary Care Sports Medicine  [facility]

## 2021-03-17 NOTE — LETTER
3/17/2021         RE: Raissa Ruth  [address] [place] [facility] [address]        Dear Colleague,    Thank you for referring your patient, Raissa Ruth, to the [facility]. Please see a copy of my visit note below.    Raissa Ruth  :  1995  DOS: 3/17/2021  MRN: [id number]    Sports Medicine Clinic Visit    PCP: Clinic, [facility]    Raissa Ruth is a 25 year old female who is seen in consultation at the request of  Lenny Guillermo M.D. presenting with acute on chronic low back pain.    Injury: Gradual onset of generalized low back pain, mostly with standing/lifting at work over the past 3 - 4 months, significantly worse over the past one week.  Pain located over bilateral lower lumbar spine, right>>>left, nonradiating.  Additional Features:  Positive: weakness and muscle tension/spasm.  Symptoms are better with Ibuprofen and Rest.  Symptoms are worse with: bending at waist, twisting, lifting, prolonged standing.  Other evaluation and/or treatments so far consists of: Ice, Heat, Tylenol, Ibuprofen, Rest and PCP.  Recent imaging completed: X-rays completed 3/16/21.  Prior History of related problems: history of intermittent low back pain over the past 5+ years that she has not previously treated.  Denies morning pain/stiffness, actually feels better at that time.    Social History: works at grocery store - stocks shelves      Review of Systems  Musculoskeletal: as above  Remainder of review of systems is negative including constitutional, CV, pulmonary, GI, Skin and Neurologic except as noted in HPI or medical history.    Past Medical History:   Diagnosis Date     Anxiety      Bipolar 1 disorder (H)      Depression      History of Deliberate self-cutting      Splenomegaly 2015    when she had mono     Substance abuse (H)      Past Surgical History:   Procedure Laterality Date     APPENDECTOMY       HC TOOTH EXTRACTION W/FORCEP       Family  "History   Problem Relation Age of Onset     Breast Cancer Maternal Grandmother      Breast Cancer Paternal Grandmother      Depression Mother        Objective  /69   Ht 1.702 m (5' 7\")   Wt 61.2 kg (135 lb)   BMI 21.14 kg/m        General: healthy, alert and in no distress      HEENT: no scleral icterus or conjunctival erythema     Skin: no suspicious lesions or rash. No jaundice.     CV: regular rhythm by palpation, 2+ distal pulses, no pedal edema      Resp: normal respiratory effort without conversational dyspnea     Psych: normal mood and affect      Gait: nonantalgic, appropriate coordination and balance     Neuro: normal light touch sensory exam of the extremities. Motor strength as noted below       Low back exam:    Inspection:       no visible deformity in the low back       normal skin       normal vascular       normal lymphatic    ROM:       full flexion       limited extension due to pain       asymmetric rotation of the pelvis with flexion       Mild pain with SB and rotation       Decreased hamstring flexibility    Tender:       paraspinal muscles       Lower lumbar, b/l SI    Non Tender:       remainder of lumbar spine    Strength:       hip flexion 5/5       knee extension 5/5       ankle dorsiflexion 5/5       ankle plantarflexion 5/5       dorsiflexion of the great toe 5/5    Reflexes:       patellar (L3, L4) symmetric normal       achilles tendons (S1) symmetric normal    Sensation:      grossly intact throughout lower extremities    Skin:       well perfused       capillary refill brisk    Special tests:       straight leg raise left neg for radicular sx         straight leg raise right neg for radicular sx       positive (+) LILIANE mild b/l for SI joint pain       Neg slump, mild + facet compression     Radiology:  Recent Results (from the past 744 hour(s))   XR Lumbar Spine 2/3 Views    Narrative    XR LUMBAR SPINE 2-3 VIEWS 3/16/2021 3:38 PM     COMPARISON: 2/13/2015 CT " abdomen/pelvis    HISTORY: Chronic bilateral low back pain without sciatica; Chronic  bilateral low back pain without sciatica    FINDINGS: Normal vertebral body heights and alignment. There is  straightening of the lumbar lordosis. 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 No significant degenerative  disc disease or facet osteoarthrosis. Normal sacroiliac joints.      Impression    IMPRESSION: No acute lumbar spine abnormality    LIDYA HANSON MD       Assessment:  1. Chronic bilateral low back pain without sciatica        Plan:  Discussed the assessment with the patient.  Follow up: prn based on clinical progress  Several factors that relate to LBP including work duties, ergonomicsmechanics, posture/core stability, some signs of hypermobility, relatively straightened lumbar lordosis  Straightened lumbar spine seen on XR could be positional, due to muscle tension, and have a genetic component  No radicular sx on exam  XR images independently visualized and reviewed with patient today in clinic  Reviewed option for advanced imaging if sx worsen, do not improve, or change over time, defer for now  Many of the potential factors we reviewed are treated with conditioning, PT, and accommodations as needed  PT offered today, declined for now,   Letter for work provided today given the timing of increased LBP with changes in work duties, will place restrictions for now and amend if needed  Use of short 1-2 wk course of NSAIDs reviewed, dosing and precautions reviewed if utilized, reviewed risks of longer term use  Topical voltaren gel is safe if helpful  We discussed modified progressive pain-free activity as tolerated  Home handouts provided and supportive care reviewed  All questions were answered today  Contact us with additional questions or concerns  Signs and sx of concern reviewed    Thanks very much for sending this nice lady to us, I will keep you updated with her progress      Eduardo  DO Sherin, HARI  Sports Medicine Physician  [facility]              Disclaimer: This note consists of symbols derived from keyboarding, dictation and/or voice recognition software. As a result, there may be errors in the script that have gone undetected. Please consider this when interpreting information found in this chart.        Again, thank you for allowing me to participate in the care of your patient.        Sincerely,        Eduardo Duff DO

## 2021-03-22 ENCOUNTER — THERAPY VISIT (OUTPATIENT)
Dept: PHYSICAL THERAPY | Facility: CLINIC | Age: 26
End: 2021-03-22
Attending: FAMILY MEDICINE
Payer: OTHER MISCELLANEOUS

## 2021-03-22 DIAGNOSIS — G89.29 CHRONIC BILATERAL LOW BACK PAIN WITHOUT SCIATICA: ICD-10-CM

## 2021-03-22 DIAGNOSIS — M54.50 CHRONIC BILATERAL LOW BACK PAIN WITHOUT SCIATICA: ICD-10-CM

## 2021-03-22 PROCEDURE — 97161 PT EVAL LOW COMPLEX 20 MIN: CPT | Mod: GP | Performed by: PHYSICAL THERAPIST

## 2021-03-22 PROCEDURE — 97110 THERAPEUTIC EXERCISES: CPT | Mod: GP | Performed by: PHYSICAL THERAPIST

## 2021-03-22 PROCEDURE — 97140 MANUAL THERAPY 1/> REGIONS: CPT | Mod: GP | Performed by: PHYSICAL THERAPIST

## 2021-03-22 NOTE — PROGRESS NOTES
Physical Therapy Initial Evaluation  Subjective:    Therapist Generated HPI Evaluation  Problem details: Lower R lumbar back pain, pain when trying to go from supine to stand. Has been doing manual labor work. Some pain into buttocks. Not super painful more uncomfortable 6/10. Weight shifts to the right.         Type of problem:  Lumbar.    This is a recurrent condition.  Condition occurred with:  Other reason (thinks from manual labor).  Where condition occurred: for unknown reasons.  Patient reports pain:  Lumbar spine right (R PSIS region).  Pain is described as aching (radiating to tail bone) and is constant.  Pain radiates to:  Gluteals right and gluteals left. Pain is worse during the day (no specific ).  Since onset symptoms are gradually worsening.  Associated symptoms:  Numbness, loss of motion/stiffness, loss of strength and loss of motion (numbness LB area only). Symptoms are exacerbated by bending, carrying, lifting, twisting and lying down  and relieved by heat, ice and nothing.  Special tests included:  X-ray.  Previous treatment includes chiropractic. There was none improvement following previous treatment.  Restrictions due to condition include:  Working in normal job with restrictions (10 pound lifting restriction).  Barriers include:  None as reported by patient.    Patient Health History  Raissa Ruth being seen for low back pain .     Problem began: 3/17/2021 (MD visit).   Problem occurred: 3 years ago onset low back pain potentially due to repetitive lifting and manual labor.  No specific incident.   Pain is reported as 1/10 on pain scale.  General health as reported by patient is good.  Pertinent medical history includes: chemical dependency, mental illness, migraines/headaches and smoking.   Red flags:  None as reported by patient.  Medical allergies: none.   Surgeries include:  None.    Current medications:  Hormone replacement therapy (mood stabilizers ).    Current occupation is grocery  store  (student ).   Primary job tasks include:  Lifting/carrying, prolonged standing and repetitive tasks (lifting up to 25-30# repetitively).                                    Objective:  Raissa Ruth , : 1995, MRN: [id number]    Physical Therapy Objective Findings  Subjective information, goals, clinical impression, daily documentation and other information found in EPISODES tab.  Objective:     Lumbar Pain    Posture: seated posture- rounded shoulder and forward head,  Some increase in pain   Posture correction with lumbar roll, decreased pain  Gait:  normal  Lumbar Range of Motion:  Flexion                                                                     Full motion                                                                                                       Extension Min loss of motion    Right Side Bending Mod loss    Left Side Bending Mod loss    Repeated extension- standing Increase low back pain and no symptoms in glutes    Repeated flexion- standing Full motion -increases pain with reps with symptoms in glutes     Pelvic screen:                                                                         Positive                                            Negative                                             Innominate rotation + R posterior                        Other: pain with resisted hip abd/adduction in hooklying        Hip Screen:                                                                  Positive                                             Negative                                             Hip ROM WNL    Mariama ASHTON  -   Other:  -   Manual Muscle Testing (graded 0-5, measured at 0 degrees unless otherwise noted):                                                                              Right                                  Left                                                     Transversus Abdominus     -Brien Leg Lowering (deg)     Hip Flex L2 4 4+    Hip Abd     Hip Add     Hip Ext     Knee Flex 4 4   Knee Ext L3 4 5   Ankle Dorsiflexion L4 5 5   Great Toe Extension L5     Ankle Plantar Flexion S1 5 5   Other:     (+ mild pain, ++ moderate pain, +++ severe pain)    Special Tests:                                                                     Positive                                             Negative                                             Sign of Buttock     SLR + on R    Shamar Test     Ely Test     Prone instability Test     Crossover SLR  - on L   Repeated extension prone +decrease in pain    Other:slump test + on L - on R     Flexibility:                                                              Right                                                 Left                                                      Hamstring     Hip flexor Minimal tightness    Quadricep Minimal tightness    Pablo's     piriformis     Other:              System    Physical Exam    General     ROS    Assessment/Plan:    Patient is a 25 year old female with lumbar complaints.    Patient has the following significant findings with corresponding treatment plan.                Diagnosis 1:  Low back pain  Pain -  manual therapy, self management, education, directional preference exercise and home program  Decreased ROM/flexibility - manual therapy, therapeutic exercise and home program  Decreased strength - therapeutic exercise, therapeutic activities and home program  Impaired gait - gait training and home program  Impaired muscle performance - neuro re-education and home program  Decreased function - therapeutic activities and home program  Impaired posture - neuro re-education, therapeutic activities and home program      Cumulative Therapy Evaluation is: Low complexity.    Previous and current functional limitations:  (See Goal Flow Sheet for this information)    Short term and Long term goals: (See Goal Flow Sheet for this information)     Communication ability:   Patient appears to be able to clearly communicate and understand verbal and written communication and follow directions correctly.  Treatment Explanation - The following has been discussed with the patient:   RX ordered/plan of care  Anticipated outcomes  Possible risks and side effects  This patient would benefit from PT intervention to resume normal activities.   Rehab potential is good.    Frequency:  2 X week, for the first week, then 1x week for following 5 weeks  Duration:  for 6 weeks  Discharge Plan:  Achieve all LTG.  Independent in home treatment program.  Reach maximal therapeutic benefit.    Please refer to the daily flowsheet for treatment today, total treatment time and time spent performing 1:1 timed codes.     Leonid Gilliland, SPT, Yudith Adhikari, DPT

## 2021-03-25 ENCOUNTER — THERAPY VISIT (OUTPATIENT)
Dept: PHYSICAL THERAPY | Facility: CLINIC | Age: 26
End: 2021-03-25
Payer: COMMERCIAL

## 2021-03-25 DIAGNOSIS — M54.50 CHRONIC BILATERAL LOW BACK PAIN WITHOUT SCIATICA: Primary | ICD-10-CM

## 2021-03-25 DIAGNOSIS — G89.29 CHRONIC BILATERAL LOW BACK PAIN WITHOUT SCIATICA: Primary | ICD-10-CM

## 2021-03-25 PROCEDURE — 97110 THERAPEUTIC EXERCISES: CPT | Mod: GP | Performed by: PHYSICAL THERAPIST

## 2021-03-25 PROCEDURE — 97530 THERAPEUTIC ACTIVITIES: CPT | Mod: GP | Performed by: PHYSICAL THERAPIST

## 2021-04-01 ENCOUNTER — TELEPHONE (OUTPATIENT)
Dept: ORTHOPEDICS | Facility: CLINIC | Age: 26
End: 2021-04-01

## 2021-04-01 ENCOUNTER — THERAPY VISIT (OUTPATIENT)
Dept: PHYSICAL THERAPY | Facility: CLINIC | Age: 26
End: 2021-04-01
Payer: OTHER MISCELLANEOUS

## 2021-04-01 DIAGNOSIS — G89.29 CHRONIC BILATERAL LOW BACK PAIN WITHOUT SCIATICA: Primary | ICD-10-CM

## 2021-04-01 DIAGNOSIS — M54.50 CHRONIC BILATERAL LOW BACK PAIN WITHOUT SCIATICA: Primary | ICD-10-CM

## 2021-04-01 PROCEDURE — 97110 THERAPEUTIC EXERCISES: CPT | Mod: GP | Performed by: PHYSICAL THERAPIST

## 2021-04-01 PROCEDURE — 97112 NEUROMUSCULAR REEDUCATION: CPT | Mod: GP | Performed by: PHYSICAL THERAPIST

## 2021-04-01 NOTE — TELEPHONE ENCOUNTER
Zee from [facility] requesting office notes and date of next visit.     Her fax number is [phone].  Return phone number if any questions [phone].    Gillian Harrison ATC

## 2021-04-09 ENCOUNTER — THERAPY VISIT (OUTPATIENT)
Dept: PHYSICAL THERAPY | Facility: CLINIC | Age: 26
End: 2021-04-09
Payer: COMMERCIAL

## 2021-04-09 DIAGNOSIS — M54.50 CHRONIC BILATERAL LOW BACK PAIN WITHOUT SCIATICA: Primary | ICD-10-CM

## 2021-04-09 DIAGNOSIS — G89.29 CHRONIC BILATERAL LOW BACK PAIN WITHOUT SCIATICA: Primary | ICD-10-CM

## 2021-04-09 PROCEDURE — 97530 THERAPEUTIC ACTIVITIES: CPT | Mod: GP | Performed by: PHYSICAL THERAPIST

## 2021-04-09 PROCEDURE — 97110 THERAPEUTIC EXERCISES: CPT | Mod: GP | Performed by: PHYSICAL THERAPIST

## 2021-04-09 PROCEDURE — 97112 NEUROMUSCULAR REEDUCATION: CPT | Mod: GP | Performed by: PHYSICAL THERAPIST

## 2021-04-16 ENCOUNTER — THERAPY VISIT (OUTPATIENT)
Dept: PHYSICAL THERAPY | Facility: CLINIC | Age: 26
End: 2021-04-16
Payer: COMMERCIAL

## 2021-04-16 DIAGNOSIS — M54.50 CHRONIC BILATERAL LOW BACK PAIN WITHOUT SCIATICA: Primary | ICD-10-CM

## 2021-04-16 DIAGNOSIS — G89.29 CHRONIC BILATERAL LOW BACK PAIN WITHOUT SCIATICA: Primary | ICD-10-CM

## 2021-04-16 PROCEDURE — 97112 NEUROMUSCULAR REEDUCATION: CPT | Mod: GP | Performed by: PHYSICAL THERAPIST

## 2021-04-16 PROCEDURE — 97140 MANUAL THERAPY 1/> REGIONS: CPT | Mod: GP | Performed by: PHYSICAL THERAPIST

## 2021-04-16 PROCEDURE — 97110 THERAPEUTIC EXERCISES: CPT | Mod: GP | Performed by: PHYSICAL THERAPIST

## 2021-04-23 ENCOUNTER — THERAPY VISIT (OUTPATIENT)
Dept: PHYSICAL THERAPY | Facility: CLINIC | Age: 26
End: 2021-04-23
Payer: COMMERCIAL

## 2021-04-23 DIAGNOSIS — G89.29 CHRONIC BILATERAL LOW BACK PAIN WITHOUT SCIATICA: Primary | ICD-10-CM

## 2021-04-23 DIAGNOSIS — M54.50 CHRONIC BILATERAL LOW BACK PAIN WITHOUT SCIATICA: Primary | ICD-10-CM

## 2021-04-23 PROCEDURE — 97530 THERAPEUTIC ACTIVITIES: CPT | Mod: GP | Performed by: PHYSICAL THERAPIST

## 2021-04-23 PROCEDURE — 97140 MANUAL THERAPY 1/> REGIONS: CPT | Mod: GP | Performed by: PHYSICAL THERAPIST

## 2021-04-23 PROCEDURE — 97112 NEUROMUSCULAR REEDUCATION: CPT | Mod: GP | Performed by: PHYSICAL THERAPIST

## 2021-04-23 NOTE — LETTER
[facility]  [address]  [place] [address]  [phone]    2021    Re: Raissa Ruth   :   1995  MRN:  [id number]   REFERRING PHYSICIAN:   Lenny LUQUE [facility]  Date of Initial Evaluation:  3/22/21  Visits:  Rxs Used: 6  Reason for Referral:  Chronic bilateral low back pain without sciatica    Assessment/Plan:    PROGRESS  REPORT  Progress reporting period is from 3/22/2021 to 2021.   Patient has completed 6 PT visits.      SUBJECTIVE  Subjective: Patient contacted MD and she received updated work restrictions-increased to lifting #25 from 10#, worked a shift earlier today which increased her LB pain. Patient feels she is progressing with PT and would like to continue as she still has ways to progress, in terms work demands.     Current Pain level: 4/10.     Initial Pain level: 6/10(at worst).   Changes in function:  Yes (See Goal flowsheet attached for changes in current functional level)  Adverse reaction to treatment or activity: None    OBJECTIVE  Changes noted in objective findings:  Yes, increased lumbar ROM: full flexion(no loss), ext min loss, R/L SB min loss(from mod loss). Plank for 1 minute 40 sec. MMT: hip flex 4+/5 R, 4/5 L, hip ext 5/5 bilateral, abd 4+/5 bilaterally.  MMT: knee flex 4/5 bilaterally, ext 5/5 bilaterally. Able to complete lifting an item off of the floor weighing #25 x10.    ASSESSMENT/PLAN  Updated problem list and treatment plan: Diagnosis 1:  Low back pain  Pain -  manual therapy, self management, education, directional preference exercise and home program  Decreased ROM/flexibility - manual therapy, therapeutic exercise and home program  Decreased strength - therapeutic exercise, therapeutic activities and home program  Impaired balance - neuro re-education, therapeutic activities and home program  Impaired muscle performance -  neuro re-education and home program  Decreased function - therapeutic activities and home program  Impaired posture - neuro re-education and home program  STG/LTGs have been met or progress has been made towards goals:  Yes (See Goal flow sheet completed today.)  Assessment of Progress: The patient's condition is improving.  Self Management Plans:  Patient has been instructed in a home treatment program.  I have re-evaluated this patient and find that the nature, scope, duration and intensity of the therapy is appropriate for the medical condition of the patient.  Raissa continues to require the following intervention to meet STG and LTG's:  PT    Recommendations:  This patient would benefit from continued therapy.     Re: Raissa Ruth   :   1995    Frequency:  1 X week, once daily  Duration:  for 6 additional weeks    Please refer to the daily flowsheet for treatment today, total treatment time and time spent performing 1:1 timed codes.    Thank you for your referral.    INQUIRIES  Therapist: ALVAREZ Obregon, Britany Howe [facility] [address] [address]  Phone: [phone]  Fax: [phone]

## 2021-04-23 NOTE — PROGRESS NOTES
Subjective:  No  was used.     Physical Exam                    Objective:  System    Physical Exam    General     ROS    Assessment/Plan:    PROGRESS  REPORT    Progress reporting period is from 3/22/2021 to 4/23/2021.   Patient has completed 6 PT visits.      SUBJECTIVE  Subjective: Patient contacted MD and she received updated work restrictions-increased to lifting #25 from 10#, worked a shift earlier today which increased her LB pain. Patient feels she is progressing with PT and would like to continue as she still has ways to progress, in terms work demands.     Current Pain level: 4/10.     Initial Pain level: 6/10(at worst).   Changes in function:  Yes (See Goal flowsheet attached for changes in current functional level)  Adverse reaction to treatment or activity: None    OBJECTIVE  Changes noted in objective findings:  Yes, increased lumbar ROM: full flexion(no loss), ext min loss, R/L SB min loss(from mod loss). Plank for 1 minute 40 sec. MMT: hip flex 4+/5 R, 4/5 L, hip ext 5/5 bilateral, abd 4+/5 bilaterally.  MMT: knee flex 4/5 bilaterally, ext 5/5 bilaterally. Able to complete lifting an item off of the floor weighing #25 x10.    ASSESSMENT/PLAN  Updated problem list and treatment plan: Diagnosis 1:  Low back pain  Pain -  manual therapy, self management, education, directional preference exercise and home program  Decreased ROM/flexibility - manual therapy, therapeutic exercise and home program  Decreased strength - therapeutic exercise, therapeutic activities and home program  Impaired balance - neuro re-education, therapeutic activities and home program  Impaired muscle performance - neuro re-education and home program  Decreased function - therapeutic activities and home program  Impaired posture - neuro re-education and home program  STG/LTGs have been met or progress has been made towards goals:  Yes (See Goal flow sheet completed today.)  Assessment of Progress: The patient's  condition is improving.  Self Management Plans:  Patient has been instructed in a home treatment program.  I have re-evaluated this patient and find that the nature, scope, duration and intensity of the therapy is appropriate for the medical condition of the patient.  Raissa continues to require the following intervention to meet STG and LTG's:  PT    Recommendations:  This patient would benefit from continued therapy.     Frequency:  1 X week, once daily  Duration:  for 6 additional weeks        Please refer to the daily flowsheet for treatment today, total treatment time and time spent performing 1:1 timed codes.    Leonid Gilliland, SPT, Britany Howe PT

## 2021-04-30 ENCOUNTER — THERAPY VISIT (OUTPATIENT)
Dept: PHYSICAL THERAPY | Facility: CLINIC | Age: 26
End: 2021-04-30
Payer: OTHER MISCELLANEOUS

## 2021-04-30 DIAGNOSIS — G89.29 CHRONIC BILATERAL LOW BACK PAIN WITHOUT SCIATICA: Primary | ICD-10-CM

## 2021-04-30 DIAGNOSIS — M54.50 CHRONIC BILATERAL LOW BACK PAIN WITHOUT SCIATICA: Primary | ICD-10-CM

## 2021-04-30 PROCEDURE — 97140 MANUAL THERAPY 1/> REGIONS: CPT | Mod: GP | Performed by: PHYSICAL THERAPIST

## 2021-04-30 PROCEDURE — 97110 THERAPEUTIC EXERCISES: CPT | Mod: GP | Performed by: PHYSICAL THERAPIST

## 2021-05-06 ENCOUNTER — THERAPY VISIT (OUTPATIENT)
Dept: PHYSICAL THERAPY | Facility: CLINIC | Age: 26
End: 2021-05-06
Payer: COMMERCIAL

## 2021-05-06 DIAGNOSIS — M54.50 CHRONIC BILATERAL LOW BACK PAIN WITHOUT SCIATICA: Primary | ICD-10-CM

## 2021-05-06 DIAGNOSIS — G89.29 CHRONIC BILATERAL LOW BACK PAIN WITHOUT SCIATICA: Primary | ICD-10-CM

## 2021-05-06 PROCEDURE — 97110 THERAPEUTIC EXERCISES: CPT | Mod: GP | Performed by: PHYSICAL THERAPIST

## 2021-05-06 PROCEDURE — 97112 NEUROMUSCULAR REEDUCATION: CPT | Mod: GP | Performed by: PHYSICAL THERAPIST

## 2021-05-06 PROCEDURE — 97530 THERAPEUTIC ACTIVITIES: CPT | Mod: GP | Performed by: PHYSICAL THERAPIST

## 2021-05-29 ENCOUNTER — E-VISIT (OUTPATIENT)
Dept: URGENT CARE | Facility: URGENT CARE | Age: 26
End: 2021-05-29
Payer: COMMERCIAL

## 2021-05-29 DIAGNOSIS — R05.9 COUGH: Primary | ICD-10-CM

## 2021-05-29 PROCEDURE — 99421 OL DIG E/M SVC 5-10 MIN: CPT | Performed by: PHYSICIAN ASSISTANT

## 2021-05-29 NOTE — PATIENT INSTRUCTIONS
Covid testing has been ordered through this visit today.    Please follow the CDC Covid isolation guidelines for return to work.      Work note is in letter tab of chart.

## 2021-05-29 NOTE — LETTER
[facility]  [address] [address]  [phone]      May 29, 2021    RE:  Raissa Ruth                                                                                                                                                       [address] [place] [address]            To whom it may concern:      Raissa Ruth has been tested for covid and must remain out of work to isolate for 10 days from when symptoms started AND 24 hours after fever resolves (without fever reducing medications) AND improvement of respiratory symptoms (whichever is longer) if her test is positive.  If Covid test was done on day 7 of symptoms, and is negative, she is presumed negative for the virus.          Sincerely,        Iman Duarte PA-C    [facility]

## 2021-06-30 ENCOUNTER — THERAPY VISIT (OUTPATIENT)
Dept: PHYSICAL THERAPY | Facility: CLINIC | Age: 26
End: 2021-06-30
Payer: COMMERCIAL

## 2021-06-30 DIAGNOSIS — M54.50 CHRONIC BILATERAL LOW BACK PAIN WITHOUT SCIATICA: Primary | ICD-10-CM

## 2021-06-30 DIAGNOSIS — G89.29 CHRONIC BILATERAL LOW BACK PAIN WITHOUT SCIATICA: Primary | ICD-10-CM

## 2021-06-30 PROCEDURE — 97140 MANUAL THERAPY 1/> REGIONS: CPT | Mod: GP | Performed by: PHYSICAL THERAPIST

## 2021-06-30 PROCEDURE — 97112 NEUROMUSCULAR REEDUCATION: CPT | Mod: GP | Performed by: PHYSICAL THERAPIST

## 2021-06-30 NOTE — LETTER
[facility]  [address]  [place] [address]  [phone]    2021    Re: Raissa Ruth   :   1995  MRN:  [id number]   REFERRING PHYSICIAN:   Lenny LUQUE [facility]  Date of Initial Evaluation:  21  Visits:  Rxs Used: 9  Reason for Referral:  Chronic bilateral low back pain without sciatica  Oswestry Score: 6 %                 PROGRESS  REPORT  Progress reporting period is from 2021 to 2021.  Patient has completed 3 additional PT visits(total of 9).       SUBJECTIVE  Subjective: Patient returns to clinic after 6+ week absence reporting that she got sick(with a cold) along with bout of depression and spent most of her time in bed for ~1-2 weeks total.  As a result, she stopped doing her exercises which lead to a flare-up of her LBP. She has resumed exercises and notes some improvement. Currently experiencing dull aching/stiffness primarily on R side of LB and occasionally on L as well. Patient working 22-24 hours/week regular duties. Pateint reports she is able to lift 25# x15 time swith 3/10.    Current Pain level: 3/10.     Previous pain level was  4/10   Initial Pain level: 6/10.   Changes in function:  Yes (See Goal flowsheet attached for changes in current functional level)  Adverse reaction to treatment or activity: None    OBJECTIVE  Objective: Patient demonstrates good sitting posture. Improved AROM of lumbar spine: flexion no loss, extension no loss, R/L SB no loss. Decreased P/A lumbar spinal segment mobility.  Reduced ability to perform plank for 1 minute 25 sec. SLS each LE with EO x60 seconds, wth EC < 20 seconds B. MMT of B hips: flex B 4+/5, abd 4+/5 bilaterally(no change), knee: flex B 5/5, ext 5/5 B. Able to complete 15X #25 with 3/10 PL. JONATHAN remains 6%.     ASSESSMENT/PLAN  Updated problem list and treatment plan: Diagnosis 1:  LBP  Pain -   manual therapy, self management, directional preference exercise and home program  Decreased joint mobility - manual therapy, therapeutic exercise, therapeutic activity and home program  Decreased strength - therapeutic exercise, therapeutic activities and home program  Decreased function - therapeutic activities and home program  STG/LTGs have been met or progress has been made towards goals:  Yes (See Goal flow sheet completed today.)  Re: Raissa Ruth   :   1995  Assessment of Progress: The patient's condition has potential to improve.  The patient has had set backs in their progress(see above).  Self Management Plans:  Patient has been instructed in a home treatment program.  Patient  has been instructed in self management of symptoms.  I have re-evaluated this patient and find that the nature, scope, duration and intensity of the therapy is appropriate for the medical condition of the patient.  Raissa continues to require the following intervention to meet STG and LTG's:  PT for progression of trunk stability.    Recommendations:  This patient would benefit from continued therapy.     Frequency:  1 X week, once daily  Duration:  for 3 weeks(3 additional PT visits remain on current MD orders).          Thank you for your referral.    INQUIRIES  Therapist: Britany Howe PT  [facility] [place]  [address] [address]  Phone: [phone]  Fax: [phone]

## 2021-07-01 NOTE — PROGRESS NOTES
Subjective:  HPI  Physical Exam  Oswestry Score: 6 %                 Objective:  System    Physical Exam    General     ROS    Assessment/Plan:    PROGRESS  REPORT    Progress reporting period is from 4/23/2021 to 6/30/2021.  Patient has completed 3 additional PT visits(total of 9).       SUBJECTIVE  Subjective: Patient returns to clinic after 6+ week absence reporting that she got sick(with a cold) along with bout of depression and spent most of her time in bed for ~1-2 weeks total.  As a result, she stopped doing her exercises which lead to a flare-up of her LBP. She has resumed exercises and notes some improvement. Currently experiencing dull aching/stiffness primarily on R side of LB and occasionally on L as well. Patient working 22-24 hours/week regular duties. Pateint reports she is able to lift 25# x15 time swith 3/10.    Current Pain level: 3/10.     Previous pain level was  4/10   Initial Pain level: 6/10.   Changes in function:  Yes (See Goal flowsheet attached for changes in current functional level)  Adverse reaction to treatment or activity: None    OBJECTIVE    Objective: Patient demonstrates good sitting posture. Improved AROM of lumbar spine: flexion no loss, extension no loss, R/L SB no loss. Decreased P/A lumbar spinal segment mobility.  Reduced ability to perform plank for 1 minute 25 sec. SLS each LE with EO x60 seconds, wth EC < 20 seconds B. MMT of B hips: flex B 4+/5, abd 4+/5 bilaterally(no change), knee: flex B 5/5, ext 5/5 B. Able to complete 15X #25 with 3/10 PL. JONATHAN remains 6%.     ASSESSMENT/PLAN  Updated problem list and treatment plan: Diagnosis 1:  LBP  Pain -  manual therapy, self management, directional preference exercise and home program  Decreased joint mobility - manual therapy, therapeutic exercise, therapeutic activity and home program  Decreased strength - therapeutic exercise, therapeutic activities and home program  Decreased function - therapeutic activities and home  program  STG/LTGs have been met or progress has been made towards goals:  Yes (See Goal flow sheet completed today.)  Assessment of Progress: The patient's condition has potential to improve.  The patient has had set backs in their progress(see above).  Self Management Plans:  Patient has been instructed in a home treatment program.  Patient  has been instructed in self management of symptoms.  I have re-evaluated this patient and find that the nature, scope, duration and intensity of the therapy is appropriate for the medical condition of the patient.  Raissa continues to require the following intervention to meet STG and LTG's:  PT for progression of trunk stability.    Recommendations:  This patient would benefit from continued therapy.     Frequency:  1 X week, once daily  Duration:  for 3 weeks(3 additional PT visits remain on current MD orders).        Please refer to the daily flowsheet for treatment today, total treatment time and time spent performing 1:1 timed codes.

## 2021-07-16 ENCOUNTER — THERAPY VISIT (OUTPATIENT)
Dept: PHYSICAL THERAPY | Facility: CLINIC | Age: 26
End: 2021-07-16
Payer: COMMERCIAL

## 2021-07-16 DIAGNOSIS — M54.50 CHRONIC BILATERAL LOW BACK PAIN WITHOUT SCIATICA: Primary | ICD-10-CM

## 2021-07-16 DIAGNOSIS — G89.29 CHRONIC BILATERAL LOW BACK PAIN WITHOUT SCIATICA: Primary | ICD-10-CM

## 2021-07-16 PROCEDURE — 97110 THERAPEUTIC EXERCISES: CPT | Mod: GP | Performed by: PHYSICAL THERAPIST

## 2021-07-16 PROCEDURE — 97112 NEUROMUSCULAR REEDUCATION: CPT | Mod: GP | Performed by: PHYSICAL THERAPIST

## 2021-07-16 PROCEDURE — 97140 MANUAL THERAPY 1/> REGIONS: CPT | Mod: GP | Performed by: PHYSICAL THERAPIST

## 2021-09-26 ENCOUNTER — HEALTH MAINTENANCE LETTER (OUTPATIENT)
Age: 26
End: 2021-09-26

## 2021-10-02 NOTE — PROGRESS NOTES
1. Generalized anxiety disorder  Having lots of anxiety, needs a new psychiatrist. She reports the bipolar dx is wrong, will refer to psych to determine that.   - MENTAL HEALTH REFERRAL  - Adult; Psychiatry; Psychiatry; [facility] [phone]; We will contact you to schedule the appointment or please call with any questions; Future  - lamoTRIgine (LAMICTAL) 100 MG tablet; Take 0.5 tablets (50 mg) by mouth daily    2. Major depressive disorder, single episode, severe (H)  Doesn't want medication at this time for anxiety/depression. Using CBD at bedtime for sleep, was on lamictal for her mood for a while but is  Tapering down.   - MENTAL HEALTH REFERRAL  - Adult; Psychiatry; Psychiatry; [facility] [phone]; We will contact you to schedule the appointment or please call with any questions; Future  - lamoTRIgine (LAMICTAL) 100 MG tablet; Take 0.5 tablets (50 mg) by mouth daily    3. Screening for diabetes mellitus  Would like screening, has concerns about having diabetes  - Basic metabolic panel  (Ca, Cl, CO2, Creat, Gluc, K, Na, BUN); Future  - Hemoglobin A1c; Future  - Hemoglobin A1c  - Basic metabolic panel  (Ca, Cl, CO2, Creat, Gluc, K, Na, BUN)    4. Screening for cholesterol level  Is vegan, concerned about eating foods that may be too high in saturated fats. Feels she is getting enough iron in her diet  - Lipid panel reflex to direct LDL Fasting; Future  - Lipid panel reflex to direct LDL Fasting          Mik Haji is a 26 year old who presents for the following health issues     History of Present Illness       Diabetes:   She presents for follow up of diabetes.  She is not checking blood glucose. She is concerned about other. She is not experiencing numbness or burning in feet, excessive thirst, blurry vision, weight changes or redness, sores or blisters on feet.         She eats 4 or more servings of fruits and vegetables daily.She consumes 0 sweetened beverage(s)  "daily.She exercises with enough effort to increase her heart rate 30 to 60 minutes per day.  She exercises with enough effort to increase her heart rate 4 days per week.   She is taking medications regularly.     Follows Vegan diet. Eats a lot of fruit and other sugars still.   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   Energy is good.  Takes protein powder in a shake. Also started taking cradum.  She is also tried mushrooms, trying more 'natural' stuff to help her anxiety and sleep.  Advised to stop that as it can be addictive.     Hx of addiction, drugs/alcohol. sobor for 2.5 years.     Hx of PMDD    wellbutrin made her too stimulated  Started using CBD to help her sleep    Denies self cutting or injurious behavior.         Review of Systems         Objective    /70   Pulse 84   Temp 98.6  F (37  C) (Oral)   Resp 18   Ht 1.702 m (5' 7\")   Wt 62.3 kg (137 lb 6.4 oz)   SpO2 96%   BMI 21.52 kg/m    Body mass index is 21.52 kg/m .  Physical Exam   GENERAL: healthy, alert and no distress  RESP: lungs clear to auscultation - no rales, rhonchi or wheezes  CV: regular rate and rhythm, normal S1 S2, no S3 or S4, no murmur, click or rub  MS: no gross musculoskeletal defects noted, no edema  PSYCH: mentation appears normal, affect normal to anxious    Office Visit on 01/22/2021   Component Date Value Ref Range Status     WBC 01/22/2021 5.8  4.0 - 11.0 10e9/L Final     RBC Count 01/22/2021 4.46  3.8 - 5.2 10e12/L Final     Hemoglobin 01/22/2021 13.4  11.7 - 15.7 g/dL Final     Hematocrit 01/22/2021 39.8  35.0 - 47.0 % Final     MCV 01/22/2021 89  78 - 100 fl Final     MCH 01/22/2021 30.0  26.5 - 33.0 pg Final     MCHC 01/22/2021 33.7  31.5 - 36.5 g/dL Final     RDW 01/22/2021 11.6  10.0 - 15.0 % Final     Platelet Count 01/22/2021 266  150 - 450 10e9/L Final     % Neutrophils 01/22/2021 50.5  % Final     % Lymphocytes 01/22/2021 34.1  % Final     % Monocytes 01/22/2021 12.7  % " Final     % Eosinophils 01/22/2021 2.4  % Final     % Basophils 01/22/2021 0.3  % Final     Absolute Neutrophil 01/22/2021 3.0  1.6 - 8.3 10e9/L Final     Absolute Lymphocytes 01/22/2021 2.0  0.8 - 5.3 10e9/L Final     Absolute Monocytes 01/22/2021 0.7  0.0 - 1.3 10e9/L Final     Absolute Eosinophils 01/22/2021 0.1  0.0 - 0.7 10e9/L Final     Absolute Basophils 01/22/2021 0.0  0.0 - 0.2 10e9/L Final     Diff Method 01/22/2021 Automated Method   Final     Sodium 01/22/2021 139  133 - 144 mmol/L Final     Potassium 01/22/2021 3.3* 3.4 - 5.3 mmol/L Final     Chloride 01/22/2021 104  94 - 109 mmol/L Final     Carbon Dioxide 01/22/2021 30  20 - 32 mmol/L Final     Anion Gap 01/22/2021 5  3 - 14 mmol/L Final     Glucose 01/22/2021 63* 70 - 99 mg/dL Final    Non Fasting     Urea Nitrogen 01/22/2021 10  7 - 30 mg/dL Final     Creatinine 01/22/2021 0.72  0.52 - 1.04 mg/dL Final     GFR Estimate 01/22/2021 >90  >60 mL/min/[1.73_m2] Final    Comment: Non  GFR Calc  Starting 12/18/2018, serum creatinine based estimated GFR (eGFR) will be   calculated using the Chronic Kidney Disease Epidemiology Collaboration   (CKD-EPI) equation.       GFR Estimate If Black 01/22/2021 >90  >60 mL/min/[1.73_m2] Final    Comment:  GFR Calc  Starting 12/18/2018, serum creatinine based estimated GFR (eGFR) will be   calculated using the Chronic Kidney Disease Epidemiology Collaboration   (CKD-EPI) equation.       Calcium 01/22/2021 9.6  8.5 - 10.1 mg/dL Final     Bilirubin Total 01/22/2021 0.2  0.2 - 1.3 mg/dL Final     Albumin 01/22/2021 4.2  3.4 - 5.0 g/dL Final     Protein Total 01/22/2021 7.5  6.8 - 8.8 g/dL Final     Alkaline Phosphatase 01/22/2021 74  40 - 150 U/L Final     ALT 01/22/2021 21  0 - 50 U/L Final     AST 01/22/2021 11  0 - 45 U/L Final     TSH 01/22/2021 1.05  0.40 - 4.00 mU/L Final

## 2021-10-04 ENCOUNTER — OFFICE VISIT (OUTPATIENT)
Dept: FAMILY MEDICINE | Facility: CLINIC | Age: 26
End: 2021-10-04
Payer: COMMERCIAL

## 2021-10-04 VITALS
DIASTOLIC BLOOD PRESSURE: 70 MMHG | TEMPERATURE: 98.6 F | HEART RATE: 84 BPM | SYSTOLIC BLOOD PRESSURE: 101 MMHG | HEIGHT: 67 IN | WEIGHT: 137.4 LBS | BODY MASS INDEX: 21.56 KG/M2 | OXYGEN SATURATION: 96 % | RESPIRATION RATE: 18 BRPM

## 2021-10-04 DIAGNOSIS — F41.1 GENERALIZED ANXIETY DISORDER: Primary | ICD-10-CM

## 2021-10-04 DIAGNOSIS — F32.2 MAJOR DEPRESSIVE DISORDER, SINGLE EPISODE, SEVERE (H): ICD-10-CM

## 2021-10-04 DIAGNOSIS — Z13.220 SCREENING FOR CHOLESTEROL LEVEL: ICD-10-CM

## 2021-10-04 DIAGNOSIS — Z13.1 SCREENING FOR DIABETES MELLITUS: ICD-10-CM

## 2021-10-04 LAB
ANION GAP SERPL CALCULATED.3IONS-SCNC: 5 MMOL/L (ref 3–14)
BUN SERPL-MCNC: 10 MG/DL (ref 7–30)
CALCIUM SERPL-MCNC: 9.4 MG/DL (ref 8.5–10.1)
CHLORIDE BLD-SCNC: 107 MMOL/L (ref 94–109)
CHOLEST SERPL-MCNC: 117 MG/DL
CO2 SERPL-SCNC: 28 MMOL/L (ref 20–32)
CREAT SERPL-MCNC: 0.82 MG/DL (ref 0.52–1.04)
FASTING STATUS PATIENT QL REPORTED: YES
GFR SERPL CREATININE-BSD FRML MDRD: >90 ML/MIN/1.73M2
GLUCOSE BLD-MCNC: 91 MG/DL (ref 70–99)
HBA1C MFR BLD: 5 % (ref 0–5.6)
HDLC SERPL-MCNC: 60 MG/DL
LDLC SERPL CALC-MCNC: 49 MG/DL
NONHDLC SERPL-MCNC: 57 MG/DL
POTASSIUM BLD-SCNC: 3.9 MMOL/L (ref 3.4–5.3)
SODIUM SERPL-SCNC: 140 MMOL/L (ref 133–144)
TRIGL SERPL-MCNC: 38 MG/DL

## 2021-10-04 PROCEDURE — 80048 BASIC METABOLIC PNL TOTAL CA: CPT | Performed by: NURSE PRACTITIONER

## 2021-10-04 PROCEDURE — 99214 OFFICE O/P EST MOD 30 MIN: CPT | Performed by: NURSE PRACTITIONER

## 2021-10-04 PROCEDURE — 83036 HEMOGLOBIN GLYCOSYLATED A1C: CPT | Performed by: NURSE PRACTITIONER

## 2021-10-04 PROCEDURE — 36415 COLL VENOUS BLD VENIPUNCTURE: CPT | Performed by: NURSE PRACTITIONER

## 2021-10-04 PROCEDURE — 80061 LIPID PANEL: CPT | Performed by: NURSE PRACTITIONER

## 2021-10-04 RX ORDER — LAMOTRIGINE 100 MG/1
50 TABLET ORAL DAILY
COMMUNITY
Start: 2021-10-04 | End: 2022-04-28

## 2021-10-04 ASSESSMENT — MIFFLIN-ST. JEOR: SCORE: 1400.87

## 2021-10-04 NOTE — RESULT ENCOUNTER NOTE
Baldomero Haji,   You do not have diabetes or pre-diabetes. Your other labs are still pending.  Thank you,  HORTENCIA Watt, NP-C  [facility]

## 2021-10-04 NOTE — RESULT ENCOUNTER NOTE
Baldomero Haji,   All of your labs look normal.   Thank you,  HORTENCIA Watt, NP-C  [facility]

## 2022-01-15 ENCOUNTER — HEALTH MAINTENANCE LETTER (OUTPATIENT)
Age: 27
End: 2022-01-15

## 2022-02-11 NOTE — PROGRESS NOTES
Discharge Note    Progress reporting period is from last SOAP note on  Jul 16, 2021.    Raissa failed to follow up and current status is unknown.  Please see information below for last relevant information on current status. For more information see PN dated 6/30/2021.  Patient seen for 10 visits.    SUBJECTIVE  Subjective changes noted by patient:  Patient returns to clinic reporting that her symptoms have changed, located more in the center of her LB. Resumed going to gym and walked on TM x25 minutes at a close 4.0 mph, noting the central LBP began after that. 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 Asking to review/progress ther ball exercises.     .  Current pain level is 1/10.     Previous pain level was  6/10 (at worst).   Changes in function:  Yes (See Goal flowsheet attached for changes in current functional level)  Adverse reaction to treatment or activity: None    OBJECTIVE  Changes noted in objective findings: AROM of lumbar spine remains WNL(no loss). Discussed reducing either speed on TM when walking or reduced duration and slowly work back up to 25 min. Reviewed/progressed ther ball exercisesper pt request.     ASSESSMENT/PLAN  Diagnosis: low back pain   Updated problem list and treatment plan:   Pain - HEP  Decreased function - HEP  STG/LTGs have been met or progress has been made towards goals:  Yes, please see goal flowsheet for most current information  Assessment of Progress: current status is unknown.    Last current status: Pt is progressing slower than anticipated   Self Management Plans:  HEP  I have re-evaluated this patient and find that the nature, scope, duration and intensity of the therapy is appropriate for the medical condition of the patient.  Raissa continues to require the following intervention to meet STG and LTG's:  HEP.    Recommendations:  Discharge with current home program.  Patient to follow up with MD as needed.    Please refer to the daily flowsheet for treatment  today, total treatment time and time spent performing 1:1 timed codes.

## 2022-04-28 ENCOUNTER — E-VISIT (OUTPATIENT)
Dept: FAMILY MEDICINE | Facility: CLINIC | Age: 27
End: 2022-04-28
Payer: COMMERCIAL

## 2022-04-28 DIAGNOSIS — R05.9 COUGH: Primary | ICD-10-CM

## 2022-04-28 PROCEDURE — 99207 PR NON-BILLABLE SERV PER CHARTING: CPT | Performed by: NURSE PRACTITIONER

## 2022-04-28 NOTE — PATIENT INSTRUCTIONS
Dear Raissa Ruth,    We are sorry you are not feeling well. Based on the responses you provided, it is recommended that you be seen in-person in urgent care so we can better evaluate your symptoms. Please click here to find the nearest urgent care location to you.   You will not be charged for this Visit. Thank you for trusting us with your care.    Britany Muniz, CNP    Sorry but you will need to be evaluated in person for a note.

## 2023-03-30 ENCOUNTER — OFFICE VISIT (OUTPATIENT)
Dept: FAMILY MEDICINE | Facility: CLINIC | Age: 28
End: 2023-03-30
Payer: COMMERCIAL

## 2023-03-30 VITALS
BODY MASS INDEX: 22.76 KG/M2 | TEMPERATURE: 98.2 F | DIASTOLIC BLOOD PRESSURE: 71 MMHG | HEART RATE: 72 BPM | OXYGEN SATURATION: 97 % | HEIGHT: 67 IN | RESPIRATION RATE: 8 BRPM | WEIGHT: 145 LBS | SYSTOLIC BLOOD PRESSURE: 110 MMHG

## 2023-03-30 DIAGNOSIS — R35.0 URINARY FREQUENCY: Primary | ICD-10-CM

## 2023-03-30 DIAGNOSIS — Z78.9 VEGAN DIET: ICD-10-CM

## 2023-03-30 DIAGNOSIS — Z13.1 SCREENING FOR DIABETES MELLITUS: ICD-10-CM

## 2023-03-30 DIAGNOSIS — Z13.220 LIPID SCREENING: ICD-10-CM

## 2023-03-30 LAB
ALBUMIN UR-MCNC: NEGATIVE MG/DL
APPEARANCE UR: CLEAR
BACTERIA #/AREA URNS HPF: ABNORMAL /HPF
BILIRUB UR QL STRIP: NEGATIVE
COLOR UR AUTO: YELLOW
GLUCOSE UR STRIP-MCNC: NEGATIVE MG/DL
HGB UR QL STRIP: NEGATIVE
KETONES UR STRIP-MCNC: NEGATIVE MG/DL
LEUKOCYTE ESTERASE UR QL STRIP: NEGATIVE
NITRATE UR QL: NEGATIVE
PH UR STRIP: 7 [PH] (ref 5–7)
RBC #/AREA URNS AUTO: ABNORMAL /HPF
SP GR UR STRIP: <=1.005 (ref 1–1.03)
UROBILINOGEN UR STRIP-ACNC: 0.2 E.U./DL
WBC #/AREA URNS AUTO: ABNORMAL /HPF

## 2023-03-30 PROCEDURE — 99213 OFFICE O/P EST LOW 20 MIN: CPT | Performed by: PHYSICIAN ASSISTANT

## 2023-03-30 PROCEDURE — 87086 URINE CULTURE/COLONY COUNT: CPT | Performed by: PHYSICIAN ASSISTANT

## 2023-03-30 PROCEDURE — 81001 URINALYSIS AUTO W/SCOPE: CPT | Performed by: PHYSICIAN ASSISTANT

## 2023-03-30 ASSESSMENT — PATIENT HEALTH QUESTIONNAIRE - PHQ9
SUM OF ALL RESPONSES TO PHQ QUESTIONS 1-9: 11
SUM OF ALL RESPONSES TO PHQ QUESTIONS 1-9: 11
10. IF YOU CHECKED OFF ANY PROBLEMS, HOW DIFFICULT HAVE THESE PROBLEMS MADE IT FOR YOU TO DO YOUR WORK, TAKE CARE OF THINGS AT HOME, OR GET ALONG WITH OTHER PEOPLE: SOMEWHAT DIFFICULT

## 2023-03-30 ASSESSMENT — PAIN SCALES - GENERAL: PAINLEVEL: NO PAIN (0)

## 2023-03-30 NOTE — PATIENT INSTRUCTIONS
At [facility], we strive to deliver an exceptional experience to you, every time we see you. If you receive a survey, please complete it as we do value your feedback.  If you have MyChart, you can expect to receive results automatically within 24 hours of their completion.  Your provider will send a note interpreting your results as well.   If you do not have MyChart, you should receive your results in about a week by mail.    Your care team:                            Family Medicine Internal Medicine   MD Song Joseph MD Shantel Branch-Fleming, MD Srinivasa Vaka, MD Katya Belousova, PAHORTENCIA Delgado CNP, MD ([facility]   David Clarke, MD Lashon Serrano MD Amelia Massimini APRN MURTAZA Barrios APRN MD Allyson Alex MD          Clinic hours: Monday - Thursday 7 am-6 pm; Fridays 7 am-5 pm.   Urgent care: Monday - Friday 10 am- 8 pm; Saturday and Sunday 9 am-5 pm.    Clinic: [phone]       [facility]: Monday - Thursday 8 am - 7 pm; Friday 8 am - 6 pm  [facility]: [phone]

## 2023-03-30 NOTE — PROGRESS NOTES
Assessment & Plan   Problem List Items Addressed This Visit    None  Visit Diagnoses     Urinary frequency    -  Primary    Relevant Orders    UA with Microscopic - lab collect (Completed)    Urine Culture Aerobic Bacterial - lab collect    Urine Microscopic Exam (Completed)    Vegan diet        Relevant Orders    Vitamin D Deficiency    Magnesium    Hemoglobin    Vitamin B12    Zinc    Basic metabolic panel  (Ca, Cl, CO2, Creat, Gluc, K, Na, BUN)    Lipid screening        Relevant Orders    Lipid Profile (Chol, Trig, HDL, LDL calc)    Screening for diabetes mellitus             ua is negative for infection  Patient declined STD testing    15 minutes spent by me on the date of the encounter doing chart review, history and exam, documentation and further activities per the note           KAYLA Watts [facility] RAUDEL Haji is a 27 year old, presenting for the following health issues:  Vaginal Problem and Labs Only  No flowsheet data found.  Vaginal Problem     History of Present Illness       Reason for visit:  Possible uti and i want bloodwork done  Symptom onset:  3-4 weeks ago  Symptoms include:  Frequent urination and increased urge with little output  Symptom intensity:  Mild  Symptom progression:  Staying the same  Had these symptoms before:  Yes  Has tried/received treatment for these symptoms:  No  What makes it worse:  No  What makes it better:  Maybe cranberry juice but not sure if placebo    She eats 4 or more servings of fruits and vegetables daily.She consumes 0 sweetened beverage(s) daily.She exercises with enough effort to increase her heart rate 30 to 60 minutes per day.  She exercises with enough effort to increase her heart rate 4 days per week.   She is taking medications regularly.    Today's PHQ-9         PHQ-9 Total Score: 11    PHQ-9 Q9 Thoughts of better off dead/self-harm past 2 weeks :   Not at all    How difficult have these problems  "made it for you to do your work, take care of things at home, or get along with other people: Somewhat difficult         Patient also would like to check her cholesterol and all vitamins due to vegan diet.       Review of Systems   Genitourinary: Positive for vaginal discharge.      Constitutional, HEENT, cardiovascular, pulmonary, gi and gu systems are negative, except as otherwise noted.      Objective    /71 (BP Location: Left arm, Patient Position: Sitting, Cuff Size: Adult Regular)   Pulse 72   Temp 98.2  F (36.8  C) (Oral)   Resp (!) 8   Ht 1.712 m (5' 7.4\")   Wt 65.8 kg (145 lb)   LMP  (LMP Unknown)   SpO2 97%   BMI 22.44 kg/m    Body mass index is 22.44 kg/m .  Physical Exam   GENERAL: healthy, alert and no distress  NECK: no adenopathy, no asymmetry, masses, or scars and thyroid normal to palpation  RESP: lungs clear to auscultation - no rales, rhonchi or wheezes  CV: regular rate and rhythm, normal S1 S2, no S3 or S4, no murmur, click or rub, no peripheral edema and peripheral pulses strong  ABDOMEN: soft, nontender, no hepatosplenomegaly, no masses and bowel sounds normal  MS: no gross musculoskeletal defects noted, no edema  BACK: no CVA tenderness, no paralumbar tenderness    Results for orders placed or performed in visit on 03/30/23 (from the past 24 hour(s))   UA with Microscopic - lab collect   Result Value Ref Range    Color Urine Yellow Colorless, Straw, Light Yellow, Yellow    Appearance Urine Clear Clear    Glucose Urine Negative Negative mg/dL    Bilirubin Urine Negative Negative    Ketones Urine Negative Negative mg/dL    Specific Gravity Urine <=1.005 1.003 - 1.035    Blood Urine Negative Negative    pH Urine 7.0 5.0 - 7.0    Protein Albumin Urine Negative Negative mg/dL    Urobilinogen Urine 0.2 0.2, 1.0 E.U./dL    Nitrite Urine Negative Negative    Leukocyte Esterase Urine Negative Negative   Urine Microscopic Exam   Result Value Ref Range    Bacteria Urine Few (A) None Seen " /HPF    RBC Urine None Seen 0-2 /HPF /HPF    WBC Urine None Seen 0-5 /HPF /HPF

## 2023-04-01 LAB — BACTERIA UR CULT: NO GROWTH

## 2023-04-22 ENCOUNTER — HEALTH MAINTENANCE LETTER (OUTPATIENT)
Age: 28
End: 2023-04-22

## 2024-02-10 ENCOUNTER — HEALTH MAINTENANCE LETTER (OUTPATIENT)
Age: 29
End: 2024-02-10

## 2024-03-18 ENCOUNTER — TELEPHONE (OUTPATIENT)
Dept: FAMILY MEDICINE | Facility: CLINIC | Age: 29
End: 2024-03-18
Payer: COMMERCIAL

## 2024-03-18 NOTE — TELEPHONE ENCOUNTER
Patient Quality Outreach    Patient is due for the following:   Cervical Cancer Screening - PAP Needed  Depression  -  DAP  Physical Preventive Adult Physical      Topic Date Due    Flu Vaccine (1) 09/01/2023    COVID-19 Vaccine (3 - 2023-24 season) 09/01/2023       Next Steps:   Schedule a Adult Preventative    Type of outreach:    Sent [employer] message.      Questions for provider review:    None           Earlene Denton MA

## 2025-03-08 ENCOUNTER — HEALTH MAINTENANCE LETTER (OUTPATIENT)
Age: 30
End: 2025-03-08